# Patient Record
Sex: MALE | Race: WHITE | Employment: UNEMPLOYED | URBAN - METROPOLITAN AREA
[De-identification: names, ages, dates, MRNs, and addresses within clinical notes are randomized per-mention and may not be internally consistent; named-entity substitution may affect disease eponyms.]

---

## 2021-10-29 ENCOUNTER — HOSPITAL ENCOUNTER (INPATIENT)
Age: 21
LOS: 10 days | Discharge: HOME | DRG: 885 | End: 2021-11-08
Attending: EMERGENCY MEDICINE | Admitting: PSYCHIATRY & NEUROLOGY
Payer: COMMERCIAL

## 2021-10-29 DIAGNOSIS — F32.A DEPRESSION, UNSPECIFIED DEPRESSION TYPE: Primary | ICD-10-CM

## 2021-10-29 PROBLEM — F32.2 MDD (MAJOR DEPRESSIVE DISORDER), SEVERE (HCC): Status: ACTIVE | Noted: 2021-10-29

## 2021-10-29 LAB
ACETAMINOPHEN LEVEL: <5 MCG/ML (ref 10–30)
ALBUMIN SERPL-MCNC: 5.1 G/DL (ref 3.5–5.2)
ALP BLD-CCNC: 77 U/L (ref 40–129)
ALT SERPL-CCNC: 15 U/L (ref 0–40)
AMPHETAMINE SCREEN, URINE: NOT DETECTED
ANION GAP SERPL CALCULATED.3IONS-SCNC: 12 MMOL/L (ref 7–16)
AST SERPL-CCNC: 19 U/L (ref 0–39)
BARBITURATE SCREEN URINE: NOT DETECTED
BASOPHILS ABSOLUTE: 0.03 E9/L (ref 0–0.2)
BASOPHILS RELATIVE PERCENT: 0.5 % (ref 0–2)
BENZODIAZEPINE SCREEN, URINE: NOT DETECTED
BILIRUB SERPL-MCNC: 1 MG/DL (ref 0–1.2)
BILIRUBIN URINE: NEGATIVE
BLOOD, URINE: NEGATIVE
BUN BLDV-MCNC: 13 MG/DL (ref 6–20)
CALCIUM SERPL-MCNC: 10 MG/DL (ref 8.6–10.2)
CANNABINOID SCREEN URINE: NOT DETECTED
CHLORIDE BLD-SCNC: 99 MMOL/L (ref 98–107)
CLARITY: CLEAR
CO2: 25 MMOL/L (ref 22–29)
COCAINE METABOLITE SCREEN URINE: NOT DETECTED
COLOR: YELLOW
CREAT SERPL-MCNC: 0.8 MG/DL (ref 0.7–1.2)
EOSINOPHILS ABSOLUTE: 0.18 E9/L (ref 0.05–0.5)
EOSINOPHILS RELATIVE PERCENT: 2.9 % (ref 0–6)
ETHANOL: <10 MG/DL (ref 0–0.08)
FENTANYL SCREEN, URINE: NOT DETECTED
GFR AFRICAN AMERICAN: >60
GFR NON-AFRICAN AMERICAN: >60 ML/MIN/1.73
GLUCOSE BLD-MCNC: 85 MG/DL (ref 74–99)
GLUCOSE URINE: NEGATIVE MG/DL
HCT VFR BLD CALC: 46.7 % (ref 37–54)
HEMOGLOBIN: 16 G/DL (ref 12.5–16.5)
IMMATURE GRANULOCYTES #: 0.01 E9/L
IMMATURE GRANULOCYTES %: 0.2 % (ref 0–5)
INFLUENZA A: NOT DETECTED
INFLUENZA B: NOT DETECTED
KETONES, URINE: NEGATIVE MG/DL
LEUKOCYTE ESTERASE, URINE: NEGATIVE
LYMPHOCYTES ABSOLUTE: 1.9 E9/L (ref 1.5–4)
LYMPHOCYTES RELATIVE PERCENT: 30.8 % (ref 20–42)
Lab: NORMAL
MCH RBC QN AUTO: 31.2 PG (ref 26–35)
MCHC RBC AUTO-ENTMCNC: 34.3 % (ref 32–34.5)
MCV RBC AUTO: 91 FL (ref 80–99.9)
METHADONE SCREEN, URINE: NOT DETECTED
MONOCYTES ABSOLUTE: 0.37 E9/L (ref 0.1–0.95)
MONOCYTES RELATIVE PERCENT: 6 % (ref 2–12)
NEUTROPHILS ABSOLUTE: 3.67 E9/L (ref 1.8–7.3)
NEUTROPHILS RELATIVE PERCENT: 59.6 % (ref 43–80)
NITRITE, URINE: NEGATIVE
OPIATE SCREEN URINE: NOT DETECTED
OXYCODONE URINE: NOT DETECTED
PDW BLD-RTO: 11.4 FL (ref 11.5–15)
PH UA: 7 (ref 5–9)
PHENCYCLIDINE SCREEN URINE: NOT DETECTED
PLATELET # BLD: 282 E9/L (ref 130–450)
PMV BLD AUTO: 9.7 FL (ref 7–12)
POTASSIUM REFLEX MAGNESIUM: 4.7 MMOL/L (ref 3.5–5)
PROTEIN UA: NEGATIVE MG/DL
RBC # BLD: 5.13 E12/L (ref 3.8–5.8)
SALICYLATE, SERUM: <0.3 MG/DL (ref 0–30)
SARS-COV-2 RNA, RT PCR: NOT DETECTED
SODIUM BLD-SCNC: 136 MMOL/L (ref 132–146)
SPECIFIC GRAVITY UA: 1.02 (ref 1–1.03)
TOTAL PROTEIN: 7.6 G/DL (ref 6.4–8.3)
TRICYCLIC ANTIDEPRESSANTS SCREEN SERUM: NEGATIVE NG/ML
UROBILINOGEN, URINE: 2 E.U./DL
WBC # BLD: 6.2 E9/L (ref 4.5–11.5)

## 2021-10-29 PROCEDURE — 93005 ELECTROCARDIOGRAM TRACING: CPT | Performed by: EMERGENCY MEDICINE

## 2021-10-29 PROCEDURE — 80143 DRUG ASSAY ACETAMINOPHEN: CPT

## 2021-10-29 PROCEDURE — 1240000000 HC EMOTIONAL WELLNESS R&B

## 2021-10-29 PROCEDURE — 80307 DRUG TEST PRSMV CHEM ANLYZR: CPT

## 2021-10-29 PROCEDURE — 87636 SARSCOV2 & INF A&B AMP PRB: CPT

## 2021-10-29 PROCEDURE — 85025 COMPLETE CBC W/AUTO DIFF WBC: CPT

## 2021-10-29 PROCEDURE — 80179 DRUG ASSAY SALICYLATE: CPT

## 2021-10-29 PROCEDURE — 82077 ASSAY SPEC XCP UR&BREATH IA: CPT

## 2021-10-29 PROCEDURE — 80053 COMPREHEN METABOLIC PANEL: CPT

## 2021-10-29 PROCEDURE — 99284 EMERGENCY DEPT VISIT MOD MDM: CPT

## 2021-10-29 PROCEDURE — 81003 URINALYSIS AUTO W/O SCOPE: CPT

## 2021-10-29 ASSESSMENT — LIFESTYLE VARIABLES: HISTORY_ALCOHOL_USE: NO

## 2021-10-29 NOTE — ED PROVIDER NOTES
HPI:  10/29/21,   Time: 5:05 PM EDT       Christopher Alvarenga is a 22 y.o. male presenting to the ED for depressed/not eating/stressed, beginning weeks ago. The complaint has been persistent, moderate in severity, and worsened by stress. 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 Pt won't talk on exam, has access to firearm. No n/v/d/fever/chills/sweats/abd pain/cp/sob    Review of Systems:   Pertinent positives and negatives are stated within HPI, all other systems reviewed and are negative.          --------------------------------------------- PAST HISTORY ---------------------------------------------  Past Medical History:  has no past medical history on file. Past Surgical History:  has no past surgical history on file. Social History:  reports that he has never smoked. He has never used smokeless tobacco. He reports that he does not drink alcohol and does not use drugs. Family History: family history is not on file. The patients home medications have been reviewed. Allergies: Patient has no known allergies. ---------------------------------------------------PHYSICAL EXAM--------------------------------------    Constitutional/General: Alert and oriented x3,   Head: Normocephalic and atraumatic  Eyes: PERRL, EOMI, conjunctive normal, sclera non icteric  Mouth: Oropharynx clear, handling secretions,   Neck: Supple, full ROM,   Respiratory: Not in respiratory distress  Cardiovascular:  Regular rate. Regular rhythm. No murmurs, gallops, or rubs. 2+ distal pulses  Chest: No chest wall tenderness  GI:  Abdomen Soft, Non tender, Non distended. Musculoskeletal: Moves all extremities x 4. Warm and well perfused, no clubbing, cyanosis, or edema. Capillary refill <3 seconds  Integument: skin warm and dry. No rashes.    Lymphatic: no lymphadenopathy noted  Neurologic: GCS 15, no focal deficits,   Psychiatric: flat Affect    -------------------------------------------------- RESULTS -------------------------------------------------  I have personally reviewed all laboratory and imaging results for this patient. Results are listed below.      LABS:  Results for orders placed or performed during the hospital encounter of 10/29/21   COVID-19 & Influenza Combo    Specimen: Nasopharyngeal Swab   Result Value Ref Range    SARS-CoV-2 RNA, RT PCR NOT DETECTED NOT DETECTED    INFLUENZA A NOT DETECTED NOT DETECTED    INFLUENZA B NOT DETECTED NOT DETECTED   CBC Auto Differential   Result Value Ref Range    WBC 6.2 4.5 - 11.5 E9/L    RBC 5.13 3.80 - 5.80 E12/L    Hemoglobin 16.0 12.5 - 16.5 g/dL    Hematocrit 46.7 37.0 - 54.0 %    MCV 91.0 80.0 - 99.9 fL    MCH 31.2 26.0 - 35.0 pg    MCHC 34.3 32.0 - 34.5 %    RDW 11.4 (L) 11.5 - 15.0 fL    Platelets 253 972 - 609 E9/L    MPV 9.7 7.0 - 12.0 fL    Neutrophils % 59.6 43.0 - 80.0 %    Immature Granulocytes % 0.2 0.0 - 5.0 %    Lymphocytes % 30.8 20.0 - 42.0 %    Monocytes % 6.0 2.0 - 12.0 %    Eosinophils % 2.9 0.0 - 6.0 %    Basophils % 0.5 0.0 - 2.0 %    Neutrophils Absolute 3.67 1.80 - 7.30 E9/L    Immature Granulocytes # 0.01 E9/L    Lymphocytes Absolute 1.90 1.50 - 4.00 E9/L    Monocytes Absolute 0.37 0.10 - 0.95 E9/L    Eosinophils Absolute 0.18 0.05 - 0.50 E9/L    Basophils Absolute 0.03 0.00 - 0.20 E9/L   Comprehensive Metabolic Panel w/ Reflex to MG   Result Value Ref Range    Sodium 136 132 - 146 mmol/L    Potassium reflex Magnesium 4.7 3.5 - 5.0 mmol/L    Chloride 99 98 - 107 mmol/L    CO2 25 22 - 29 mmol/L    Anion Gap 12 7 - 16 mmol/L    Glucose 85 74 - 99 mg/dL    BUN 13 6 - 20 mg/dL    CREATININE 0.8 0.7 - 1.2 mg/dL    GFR Non-African American >60 >=60 mL/min/1.73    GFR African American >60     Calcium 10.0 8.6 - 10.2 mg/dL    Total Protein 7.6 6.4 - 8.3 g/dL    Albumin 5.1 3.5 - 5.2 g/dL    Total Bilirubin 1.0 0.0 - 1.2 mg/dL    Alkaline Phosphatase 77 40 - 129 U/L    ALT 15 0 - 40 U/L    AST 19 0 - 39 U/L   Urine Drug Screen   Result Value Ref Range    Drug Screen Comment: see below    Serum Drug Screen   Result Value Ref Range    Ethanol Lvl <10 mg/dL    Acetaminophen Level <5.0 (L) 10.0 - 47.1 mcg/mL    Salicylate, Serum <0.1 0.0 - 30.0 mg/dL    TCA Scrn NEGATIVE Cutoff:300 ng/mL   EKG 12 Lead   Result Value Ref Range    Ventricular Rate 61 BPM    Atrial Rate 61 BPM    P-R Interval 136 ms    QRS Duration 94 ms    Q-T Interval 426 ms    QTc Calculation (Bazett) 428 ms    P Axis -13 degrees    R Axis 78 degrees    T Axis 54 degrees       RADIOLOGY:  Interpreted by Radiologist.  No orders to display       EKG: This EKG is signed and interpreted by the EP. Time: 1729  Rate: 60  Rhythm: Sinus  Interpretation: non-specific EKG  Comparison: None      ------------------------- NURSING NOTES AND VITALS REVIEWED ---------------------------   The nursing notes within the ED encounter and vital signs as below have been reviewed by myself. /68   Pulse 63   Temp 98.6 °F (37 °C) (Oral)   Resp 16   SpO2 98%   Oxygen Saturation Interpretation: Normal    The patients available past medical records and past encounters were reviewed. ------------------------------ ED COURSE/MEDICAL DECISION MAKING----------------------  Medications - No data to display      ED COURSE:       Medical Decision Making:    Medically clear, social work for psych dispo      This patient's ED course included: a personal history and physicial examination    This patient has remained hemodynamically stable during their ED course. Consultations:             Social work    Critical Care:         Counseling: The emergency provider has spoken with the patient and discussed todays results, in addition to providing specific details for the plan of care and counseling regarding the diagnosis and prognosis. Questions are answered at this time and they are agreeable with the plan. --------------------------------- IMPRESSION AND DISPOSITION ---------------------------------    IMPRESSION  1. Depression, unspecified depression type        DISPOSITION  Disposition: Admit to mental health unit - medically cleared for admission  Patient condition is stable    NOTE: This report was transcribed using voice recognition software.  Every effort was made to ensure accuracy; however, inadvertent computerized transcription errors may be present        Sully Cox MD  10/29/21 9153

## 2021-10-29 NOTE — ED NOTES
Emergency Department [facility] AN AFFILIATE OF [facility] Biopsychosocial Assessment Note    Chief Complaint:  The pt is a 22 yr old white male who was pink slipped by the [address] [place] [address] . MSE: The pt presented with blocked thought process, speaking mono tone with a flat affect- he was staring straight ahead and did not move the entire interview. He appears somewhat catatonic but did grab food tray when I left. He denied a hx of AVH. He provided some but not all info with brief yes or no answers. Clinical Summary/History: The pt was pink slipped by the [facility]  due to failure to thrive. The pink slip states that the pt is severely depressed to the point of not communicating his thoughts and feelings. The pink slip further stated that the pt has not kept up with his personal hygiene. He has been unwilling to F/U with medical and psychiatric services and has stated that he will be withdraw from classes due to lack of payment. [facility] feels that this increases his risk as school may be a protective factor. He denied to [facility] that he was suicidal or homicidal but did admit that he had access to a gun. The pt denied SI, HI, AVH, hx of AOD, and hx of in or outpt psych except for [facility] counseling center on 2 occasions. He communicated that he is not doing well in all of his classes and he is taking 6 classes and is majoring in [employer] [employer],. He stated that he lives with his mom and 3 sibs. He stated that he got a ride to [address] [place] [address] today and that his mom does not know that he is at the ED and when asked if he would like her to know he would not answer. When asked about the gun he has access to he stated that he does not have access but knows where one is but will not say. He admitted that he found out through an email that he was getting kicked out of school and that he did not do the necessary steps to stay in and he has not been able to help himself.   He did state that he wants to stay in school and tries to do his homework. He would not answer wether or not he was depressed, sad, wether he has a support system, how long he has been having difficulty and if something bad had happened to him. The pt appears to need admitted for safety and stabilization. He does not appear to be able to care for himself and could be a danger to himself. Once medically cleared the pt will be reviewed for admission to [address] [place] [address]. Gender  [x] Male [] Female [] Transgender  [] Other    Sexual Orientation    [x] Heterosexual [] Homosexual [] Bisexual [] Other    Suicidal Behavioral: CSSR-S Complete. [] Reports:    [] Past [] Present   [x] Denies    Homicidal/ Violent Behavior  [] Reports:   [] Past [] Present   [x] Denies     Hallucinations/Delusions   [] Reports:   [x] Denies     Substance Use/Alcohol Use/Addiction: SBIRT Screen Complete.    [] Reports:   [x] Denies     Trauma History  [] Reports: Refused to answer  [] Denies     Collateral Information: pink slip- [facility]      Level of Care/Disposition Plan  [] Home:   [] Outpatient Provider:   [] Crisis Unit:   [x] Inpatient Psychiatric Unit: [address] [place] [address]  [] Other:        [address] [place] [address]  10/29/21 2430

## 2021-10-29 NOTE — ED NOTES
The pt was accepted to [facility] room 7964. Disposition called to Steven Cummins in admitting.      Era Batista, [facility]  10/29/21 1959

## 2021-10-30 PROBLEM — F32.3 MDD (MAJOR DEPRESSIVE DISORDER), SINGLE EPISODE, SEVERE WITH PSYCHOTIC FEATURES (HCC): Status: ACTIVE | Noted: 2021-10-30

## 2021-10-30 PROBLEM — F32.2 MDD (MAJOR DEPRESSIVE DISORDER), SEVERE (HCC): Status: RESOLVED | Noted: 2021-10-29 | Resolved: 2021-10-30

## 2021-10-30 PROCEDURE — 1240000000 HC EMOTIONAL WELLNESS R&B

## 2021-10-30 PROCEDURE — 6370000000 HC RX 637 (ALT 250 FOR IP): Performed by: PSYCHIATRY & NEUROLOGY

## 2021-10-30 PROCEDURE — 99222 1ST HOSP IP/OBS MODERATE 55: CPT | Performed by: PSYCHIATRY & NEUROLOGY

## 2021-10-30 RX ORDER — ACETAMINOPHEN 325 MG/1
650 TABLET ORAL EVERY 6 HOURS PRN
Status: DISCONTINUED | OUTPATIENT
Start: 2021-10-30 | End: 2021-11-08 | Stop reason: HOSPADM

## 2021-10-30 RX ORDER — HALOPERIDOL 5 MG
5 TABLET ORAL EVERY 6 HOURS PRN
Status: DISCONTINUED | OUTPATIENT
Start: 2021-10-30 | End: 2021-11-08 | Stop reason: HOSPADM

## 2021-10-30 RX ORDER — HYDROXYZINE PAMOATE 50 MG/1
50 CAPSULE ORAL 3 TIMES DAILY PRN
Status: DISCONTINUED | OUTPATIENT
Start: 2021-10-30 | End: 2021-11-08 | Stop reason: HOSPADM

## 2021-10-30 RX ORDER — HALOPERIDOL 5 MG/ML
5 INJECTION INTRAMUSCULAR EVERY 6 HOURS PRN
Status: DISCONTINUED | OUTPATIENT
Start: 2021-10-30 | End: 2021-11-08 | Stop reason: HOSPADM

## 2021-10-30 RX ORDER — NICOTINE 21 MG/24HR
1 PATCH, TRANSDERMAL 24 HOURS TRANSDERMAL DAILY
Status: DISCONTINUED | OUTPATIENT
Start: 2021-10-30 | End: 2021-10-30

## 2021-10-30 RX ORDER — TRAZODONE HYDROCHLORIDE 50 MG/1
50 TABLET ORAL NIGHTLY PRN
Status: DISCONTINUED | OUTPATIENT
Start: 2021-10-30 | End: 2021-11-08 | Stop reason: HOSPADM

## 2021-10-30 RX ORDER — MAGNESIUM HYDROXIDE/ALUMINUM HYDROXICE/SIMETHICONE 120; 1200; 1200 MG/30ML; MG/30ML; MG/30ML
30 SUSPENSION ORAL PRN
Status: DISCONTINUED | OUTPATIENT
Start: 2021-10-30 | End: 2021-11-08 | Stop reason: HOSPADM

## 2021-10-30 RX ORDER — OLANZAPINE 5 MG/1
5 TABLET ORAL NIGHTLY
Status: DISCONTINUED | OUTPATIENT
Start: 2021-10-30 | End: 2021-10-31

## 2021-10-30 RX ORDER — ESCITALOPRAM OXALATE 10 MG/1
10 TABLET ORAL DAILY
Status: DISCONTINUED | OUTPATIENT
Start: 2021-10-30 | End: 2021-11-02

## 2021-10-30 RX ADMIN — OLANZAPINE 5 MG: 5 TABLET, FILM COATED ORAL at 20:55

## 2021-10-30 RX ADMIN — ESCITALOPRAM 10 MG: 10 TABLET, FILM COATED ORAL at 11:53

## 2021-10-30 ASSESSMENT — PAIN - FUNCTIONAL ASSESSMENT: PAIN_FUNCTIONAL_ASSESSMENT: 0-10

## 2021-10-30 NOTE — BH NOTE
Pt denies suicidal or homicidal ideations. Pt denies hallucinations. Pt is isolative to room, pt continues to have a flat affect, poverty of speech / content, avoidant eye contact. No other behavioral concerns. Will follow and monitor.

## 2021-10-30 NOTE — PLAN OF CARE
Pt is stable, denies suicidal or homicidal ideations. Pt denies hallucinations. Pt has a flat and avoidant contact. Pt has negative eye contact. Pt answers questions with one word responses. Pt is isolative to room. Pt does not report a goal during morning assessment  Will monitor and follow.

## 2021-10-30 NOTE — H&P
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 His urine tox was negative blood alcohol was negative. He was pink slipped and admitted to [facility] for severe depression and with possible psychosis. Patient was largely uncooperative in the emergency department will not talk about heart is going on and will not even give any pertinent information. He will only say yes and no to some of the question. According to the pink slip by MAYTE WATTERS counselor student was dropped from the class not eating drinking batting will not talk on exam and has access to firearm. He does not answer all the question only selectively answers with yes and no. 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 According to the family he walks at [place] and he does not speak to his mind. During the whole time I interviewed he never meant in any eye contact and looking at the ground. Hygiene and grooming extremely poor. He says no to all the question but he clearly is under a lot of stress and severely depressed. He also seems almost mute and suspicious internally preoccupied. He denied any manic symptoms psychotic symptoms hallucination but he certainly is very guarded and suspicious. He said he does not want to take any medication. I talked to him about the current problem and recommended medication due to his own benefit and safety. I discussed the risk benefits side effect possible outcome alternative of the medication but he did not say he understood or not but keeps saying he will not take. I discussed with him that he is on pink slip and he is in acute crisis.   If he does not cooperate with the treatment and does not take the treatment he reports himself acute wrist and the team will not be able to make discharge planning. Case may be hard at the probate court next Friday if he chooses not to start medication. Cognitive function is dull. Insight and judgment poor. Impulse control poor. ER notes suggest that he has access to gun but patient denies    PAST PSYCHIATRIC HISTORY:   [facility] but [facility], he said he saw the counselor twice  Denies having any treatment for depression    PAST MEDICAL HISTORY: History reviewed. No pertinent past medical history. MEDICAL ROS:  All reviews are negative except what is stated in HPI    ALLERGIES: Patient has no known allergies. FAMILY PSYCHIATRIC HISTORY:   Does not answer question regarding that    Personal family and social history :   Lives with the mother and 3 brothers. Sister moved out. Father  about a month ago of heart attack and was not expected. He was given depressed before that. Denies any physical sexual or emotional abuse. Denies any family history of suicide    SUBSTANCE ABUSE HISTORY:   Denies.   Urine drug screening negative blood alcohol negative    VITALS: BP (!) 112/56   Pulse 57   Temp 98.1 °F (36.7 °C)   Resp 14   SpO2 100%      Physical Examination:     Head: x  Atraumatic: x normocephalic  Skin and Mucosa        Moist x  Dry   Pale  x Normal   Neck:  Thyroid  Palpable   x  Not palpable   venus distention   adenopathy   Chest: x Clear   Rhonchi     Wheezing   CV:  xS1   xS2    xNo murmer   Abdomen:  x  Soft    Tender    Viceromegaly   Extremities:  x No Edema     Edema     Cranial Nerves Examination:     CN II:   xPupils are reactive to light  Pupils are non reactive to light  CN III, IV, VI:  xNo eye deviation    No diplopia or ptosis   CN V:    xFacial Sensation is intact     Facial Sensation is not intact   CN IIIV:   x Hearing is normal to rubbing fingers   CN IX, X:     xNormal gag reflex and phonation   CN XI:   xShoulder shrug and neck rotation is normal  CNXII:    xTongue is midline no deviation or atrophy      For further PE refer to ED note      MENTAL STATUS EXAM:   Mental status examination revealed a poorly groomed unkempt poor hygiene [address] lean and thin  man in hospital gown does not make any eye contact and was looking at the floor during the whole interview. Psychomotor revealed extreme retardation. Eye contact was nonexistent. Speech only say yes or no to certain question and almost mute. Mood\" depressed because I am failing class\" affect is blunted anxious guarded. Thought process slow and almost mute. Thought contents with guardedness possible paranoia would not make eye contact suspicious looking. Denies suicidal homicidal thought or any other symptoms despite having a lot of signs of mental status changes. Insight and judgment poor. Impulse control poor. Cognitive function could not be ascertained due to noncooperation at this time and not answering most of the question. Alert oriented to self and possibly the place.     LABS:   Admission on 10/29/2021   Component Date Value Ref Range Status    Ventricular Rate 10/29/2021 61  BPM Preliminary    Atrial Rate 10/29/2021 61  BPM Preliminary    P-R Interval 10/29/2021 136  ms Preliminary    QRS Duration 10/29/2021 94  ms Preliminary    Q-T Interval 10/29/2021 426  ms Preliminary    QTc Calculation (Bazett) 10/29/2021 428  ms Preliminary    P Axis 10/29/2021 -13  degrees Preliminary    R Axis 10/29/2021 78  degrees Preliminary    T Axis 10/29/2021 54  degrees Preliminary    WBC 10/29/2021 6.2  4.5 - 11.5 E9/L Final    RBC 10/29/2021 5.13  3.80 - 5.80 E12/L Final    Hemoglobin 10/29/2021 16.0  12.5 - 16.5 g/dL Final    Hematocrit 10/29/2021 46.7  37.0 - 54.0 % Final    MCV 10/29/2021 91.0  80.0 - 99.9 fL Final    MCH 10/29/2021 31.2  26.0 - 35.0 pg Final    MCHC 10/29/2021 34.3  32.0 - 34.5 % Final    RDW 10/29/2021 11.4* 11.5 - 15.0 fL Final    Platelets 25/19/3349 282  130 - 450 E9/L Final    MPV 10/29/2021 9.7  7.0 - 12.0 fL Final    Neutrophils % 10/29/2021 59.6  43.0 - 80.0 % Final    Immature Granulocytes % 10/29/2021 0.2  0.0 - 5.0 % Final    Lymphocytes % 10/29/2021 30.8  20.0 - 42.0 % Final    Monocytes % 10/29/2021 6.0  2.0 - 12.0 % Final    Eosinophils % 10/29/2021 2.9  0.0 - 6.0 % Final    Basophils % 10/29/2021 0.5  0.0 - 2.0 % Final    Neutrophils Absolute 10/29/2021 3.67  1.80 - 7.30 E9/L Final    Immature Granulocytes # 10/29/2021 0.01  E9/L Final    Lymphocytes Absolute 10/29/2021 1.90  1.50 - 4.00 E9/L Final    Monocytes Absolute 10/29/2021 0.37  0.10 - 0.95 E9/L Final    Eosinophils Absolute 10/29/2021 0.18  0.05 - 0.50 E9/L Final    Basophils Absolute 10/29/2021 0.03  0.00 - 0.20 E9/L Final    Sodium 10/29/2021 136  132 - 146 mmol/L Final    Potassium reflex Magnesium 10/29/2021 4.7  3.5 - 5.0 mmol/L Final    Chloride 10/29/2021 99  98 - 107 mmol/L Final    CO2 10/29/2021 25  22 - 29 mmol/L Final    Anion Gap 10/29/2021 12  7 - 16 mmol/L Final    Glucose 10/29/2021 85  74 - 99 mg/dL Final    BUN 10/29/2021 13  6 - 20 mg/dL Final    CREATININE 10/29/2021 0.8  0.7 - 1.2 mg/dL Final    GFR Non- 10/29/2021 >60  >=60 mL/min/1.73 Final    Comment: Chronic Kidney Disease: less than 60 ml/min/1.73 sq.m. Kidney Failure: less than 15 ml/min/1.73 sq.m. Results valid for patients 18 years and older.       GFR  10/29/2021 >60   Final    Calcium 10/29/2021 10.0  8.6 - 10.2 mg/dL Final    Total Protein 10/29/2021 7.6  6.4 - 8.3 g/dL Final    Albumin 10/29/2021 5.1  3.5 - 5.2 g/dL Final    Total Bilirubin 10/29/2021 1.0  0.0 - 1.2 mg/dL Final    Alkaline Phosphatase 10/29/2021 77  40 - 129 U/L Final    ALT 10/29/2021 15  0 - 40 U/L Final    AST 10/29/2021 19  0 - 39 U/L Final    Color, UA 10/29/2021 Yellow  Straw/Yellow Final    Clarity, UA 10/29/2021 Clear  Clear Final    Glucose, Ur 10/29/2021 Negative  Negative mg/dL Final    Bilirubin Urine 10/29/2021 Negative  Negative Final    Ketones, Urine 10/29/2021 Negative  Negative mg/dL Final    Specific Enloe, UA 10/29/2021 1.025  1.005 - 1.030 Final    Blood, Urine 10/29/2021 Negative  Negative Final    pH, UA 10/29/2021 7.0  5.0 - 9.0 Final    Protein, UA 10/29/2021 Negative  Negative mg/dL Final    Urobilinogen, Urine 10/29/2021 2.0* <2.0 E.U./dL Final    Nitrite, Urine 10/29/2021 Negative  Negative Final    Leukocyte Esterase, Urine 10/29/2021 Negative  Negative Final    Amphetamine Screen, Urine 10/29/2021 NOT DETECTED  Negative <1000 ng/mL Final    Barbiturate Screen, Ur 10/29/2021 NOT DETECTED  Negative < 200 ng/mL Final    Benzodiazepine Screen, Urine 10/29/2021 NOT DETECTED  Negative < 200 ng/mL Final    Cannabinoid Scrn, Ur 10/29/2021 NOT DETECTED  Negative < 50ng/mL Final    Cocaine Metabolite Screen, Urine 10/29/2021 NOT DETECTED  Negative < 300 ng/mL Final    Opiate Scrn, Ur 10/29/2021 NOT DETECTED  Negative < 300ng/mL Final    Note:  The Opiate Screen is not intended to detect Oxycodone.  PCP Screen, Urine 10/29/2021 NOT DETECTED  Negative < 25 ng/mL Final    Methadone Screen, Urine 10/29/2021 NOT DETECTED  Negative <300 ng/mL Final    Oxycodone Urine 10/29/2021 NOT DETECTED  Negative <100 ng/mL Final    FENTANYL SCREEN, URINE 10/29/2021 NOT DETECTED  Negative <1 ng/mL Final    Drug Screen Comment: 10/29/2021 see below   Final    Comment: These drug screen results are for medical purposes only and  should not be considered definitive or confirmed. The drug  methodology concentration value must be greater than or equal  to the cutoff to be reported as positive. Confirmatory testing  orders and/or interpretive screening questions can be directed  to toxicology at [phone].     The absence of expected drug(s) and/or metabolite(s) may be due  to inappropriate timing of specimen collection relative to drug  administration, poor drug absorption, diluted/adulterated urine,  or limitations of screening testing methodology.  Ethanol Lvl 10/29/2021 <10  mg/dL Final    Not Detected    Acetaminophen Level 10/29/2021 <5.0* 10.0 - 30.0 mcg/mL Final    Salicylate, Serum 64/69/0067 <0.3  0.0 - 30.0 mg/dL Final    TCA Scrn 10/29/2021 NEGATIVE  Cutoff:300 ng/mL Final    SARS-CoV-2 RNA, RT PCR 10/29/2021 NOT DETECTED  NOT DETECTED Final    Comment: Not Detected results do not preclude SARS-CoV-2 infection and  should not be used as the sole basis for patient management  decisions. Results must be combined with clinical observations,  patient history, and epidemiological information. Testing was performed using JOSIAH RICKI SARS-CoV-2 and Influenza A/B  nucleic acid assay. This test is a multiplex Real-Time Reverse  Transcriptase Polymerase Chain Reaction (RT-PCR)-based in vitro  diagnostic test intended for the qualitative detection of nucleic  acids from SARS-CoV-2, influenza A, and influenza B in nasopharyngeal  and nasal swab specimens for use under the FDAs Emergency Use  Authorization (EUA) only.     Patient Fact Sheet:  FindDrives.pl  Provider Fact Sheet: FindDrives.pl  EUA: FindDrives.pl  IFU: FindDrives.pl    Methodology:  RT-PCR      INFLUENZA A 10/29/2021 NOT DETECTED  NOT DETECTED Final    INFLUENZA B 10/29/2021 NOT DETECTED  NOT DETECTED Final           MEDICATIONS: Current Facility-Administered Medications: acetaminophen (TYLENOL) tablet 650 mg, 650 mg, Oral, Q6H PRN  magnesium hydroxide (MILK OF MAGNESIA) 400 MG/5ML suspension 30 mL, 30 mL, Oral, Daily PRN  aluminum & magnesium hydroxide-simethicone (MAALOX) 200-200-20 MG/5ML suspension 30 mL, 30 mL, Oral, PRN  hydrOXYzine (VISTARIL) capsule 50 mg, 50 psychiatrist    Electronically signed by Emory Romero MD on 10/30/2021 at 10:35 AM        Signed:  Emory Romero MD  10/30/2021  10:19 AM

## 2021-10-30 NOTE — BH NOTE
[facility]  Initial Interdisciplinary Treatment Plan NOTE    Review Date & Time: 10-30-21  1000 am    Patient was not in treatment team    Admission Type:   Admission Type: Involuntary    Reason for admission:  Reason for Admission: Depression. NO SI. Pt is student at [employer]. Doing poorly in class and not taking care of self. Per pt, counseling was offered by [employer]      Estimated Length of Stay Update:  3-5 days  Estimated Discharge Date Update: 3-5 days    EDUCATION:   Learner Progress Toward Treatment Goals: Reviewed results and recommendations of this team and Reviewed group plan and strategies    Method: Small group    Outcome: Verbalized understanding    PATIENT GOALS: pt does not report a goal during first morning assessment. PLAN/TREATMENT RECOMMENDATIONS UPDATE: begin medication regimen, assess pt responses. GOALS UPDATE:   Time frame for Short-Term Goals: Daily re assessment.      Elton Garsia RN

## 2021-10-30 NOTE — PROGRESS NOTES
[facility]  Admission Note     Pt brought to ER by [facility] Counselor for Failure to Thrive. 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 Denies SI, HI, and AVH. States he understands why he is here, Pt is flat, no eye contact. Pt refusing to answer most questions. Pt did not admit to being depressed but is upset that he received an email stating he was going to fail this semester. Pt denies any Depression or treatment in the past. Did acknowledge [facility] offered counseling to him but he did not attend per pt. Denies any medication, drug, or alcohol use. Pt lives at home with Mom and siblings. Denies any issues at home. Declined tour of unit at this time. Offered food and drink. Pt declined. Water pitcher in room. Admission Type:   Admission Type: Involuntary    Reason for admission:  Reason for Admission: Depression. NO SI. Pt is student at [employer]. Doing poorly in class and not taking care of self. Per pt, counseling was offered by [facility]    PATIENT STRENGTHS:  Strengths:  (KALIE)    Patient Strengths and Limitations:  Limitations:  (pt refused to answer)    Addictive Behavior:   Addictive Behavior  In the past 3 months, have you felt or has someone told you that you have a problem with:  :  (pt refused to answer)  Do you have a history of Chemical Use?: No  Do you have a history of Alcohol Use?: No  Do you have a history of Street Drug Abuse?: No  Histroy of Prescripton Drug Abuse?: No    Medical Problems:   History reviewed. No pertinent past medical history.     Status EXAM:  Status and Exam  Normal: No  Facial Expression: Avoids Gaze, Flat, Sad, Worried  Affect: Congruent  Level of Consciousness: Alert  Mood:Normal: No  Mood: Depressed, Sad  Motor Activity:Normal: No  Motor Activity: Decreased  Interview Behavior: Uncooperative/Withdrawn  Preception: [place] to Person, [place] to Time, [place] to Place, [place] to Situation  Attention:Normal: No  Attention: Distractible  Thought Processes: Circumstantial  Thought Content:Normal:  (KALIE. Pt states yes or no answerd if he chooses to do so)  Hallucinations: None  Delusions: No  Memory:Normal: Yes  Insight and Judgment: No  Insight and Judgment: Poor Insight, Unmotivated, Poor Judgment  Present Suicidal Ideation: No  Present Homicidal Ideation: No    Tobacco Screening:  Practical Counseling, on admission, ulises X, if applicable and completed (first 3 are required if patient doesn't refuse):            ( )  Recognizing danger situations (included triggers and roadblocks)                    ( )  Coping skills (new ways to manage stress, exercise, relaxation techniques, changing routine, distraction)                                                           ( )  Basic information about quitting (benefits of quitting, techniques in how to quit, available resources  ( ) [facility]                                           ( ) Patient refused counseling  (x ) Patient has not smoked in the last 30 days    Metabolic Screening:    No results found for: LABA1C    No results found for: CHOL  No results found for: TRIG  No results found for: HDL  No components found for: LDLCAL  No results found for: LABVLDL      There is no height or weight on file to calculate BMI. BP Readings from Last 2 Encounters:   10/29/21 (!) 111/57           Pt admitted with followings belongings:  Clothing: Footwear, Pants, Shirt, Socks, Undergarments (Comment)  Other Valuables: Cell phone, Other (Comment) (earbuds)     Patient's home medications were  N/A  Patient oriented to surroundings : Pt refused. and program expectations and copy of patient rights given. Received admission packet:. Consents reviewed, signed  Patient education on precautions.                   Vandana Moreno RN

## 2021-10-30 NOTE — CARE COORDINATION
LPC attempted to complete assessment and CSSR-S with the pt and obtain collateral VICKEY. Pt is completely non-verbal when addressed. Pt's RN reports poverty of content and lack of cooperation as well. Will attempt again later.     Electronically signed by Arely Camejo LPC on 10/30/2021 at 10:49 AM

## 2021-10-30 NOTE — ED NOTES
Pt's mom Lyndle Nyhan came to the hospital and had no idea where he was until she recently called his phone. This worker has repeatedly asked the pt if he wanted to call his mom or have this worker call. The pt's mom stated that the pt's dad  of a heart attack last month and he had been sick but they were not expecting him to die. She stated that he was depressed prior to that but he has become more despondent lately. She stated that she thinks he had trouble with some of his classes online last yr but has no idea how he is doing this year b/c he wont show her. She stated that he has never mentioned suicide to her. She stated that he does not talk to anyone about his feelings and she has been becoming concerned. She stated that in addition to going to school he works at [employer].       Josiane Jane, [facility]  10/29/21 4709

## 2021-10-31 LAB
EKG ATRIAL RATE: 61 BPM
EKG P AXIS: -13 DEGREES
EKG P-R INTERVAL: 136 MS
EKG Q-T INTERVAL: 426 MS
EKG QRS DURATION: 94 MS
EKG QTC CALCULATION (BAZETT): 428 MS
EKG R AXIS: 78 DEGREES
EKG T AXIS: 54 DEGREES
EKG VENTRICULAR RATE: 61 BPM

## 2021-10-31 PROCEDURE — 1240000000 HC EMOTIONAL WELLNESS R&B

## 2021-10-31 PROCEDURE — 6370000000 HC RX 637 (ALT 250 FOR IP): Performed by: PSYCHIATRY & NEUROLOGY

## 2021-10-31 PROCEDURE — 6370000000 HC RX 637 (ALT 250 FOR IP): Performed by: NURSE PRACTITIONER

## 2021-10-31 PROCEDURE — 93010 ELECTROCARDIOGRAM REPORT: CPT | Performed by: INTERNAL MEDICINE

## 2021-10-31 PROCEDURE — 99231 SBSQ HOSP IP/OBS SF/LOW 25: CPT | Performed by: NURSE PRACTITIONER

## 2021-10-31 RX ORDER — OLANZAPINE 5 MG/1
7.5 TABLET ORAL NIGHTLY
Status: DISCONTINUED | OUTPATIENT
Start: 2021-10-31 | End: 2021-11-01

## 2021-10-31 RX ADMIN — ESCITALOPRAM 10 MG: 10 TABLET, FILM COATED ORAL at 10:10

## 2021-10-31 RX ADMIN — OLANZAPINE 7.5 MG: 5 TABLET, FILM COATED ORAL at 22:28

## 2021-10-31 ASSESSMENT — PAIN - FUNCTIONAL ASSESSMENT: PAIN_FUNCTIONAL_ASSESSMENT: 0-10

## 2021-10-31 NOTE — PLAN OF CARE
Pt denies suicidal or homicidal ideations. Pt denies hallucinations. Pt has a flat affect. Avoids eye contact, poverty of content / speech. Pt isolative to room. Minimal engagement with staff. Pt does not report a goal during first morning assessment. Will follow and monitor.

## 2021-10-31 NOTE — PLAN OF CARE
Problem: Altered Mood, Depressive Behavior:  Goal: Able to verbalize acceptance of life and situations over which he or she has no control  Description: Able to verbalize acceptance of life and situations over which he or she has no control  10/30/2021 2211 by Venus Mcghee RN  Outcome: Ongoing     Problem: Altered Mood, Depressive Behavior:  Goal: Ability to disclose and discuss suicidal ideas will improve  Description: Ability to disclose and discuss suicidal ideas will improve  Outcome: Ongoing     Problem: Altered Mood, Depressive Behavior:  Goal: Participates in care planning  Description: Participates in care planning  Outcome: Ongoing     Pt is withdrawn to his room and self. Pt is quiet and guarded and disinterested. Pt refused to answer any questions and remained silent and stared at this nurse during medication administration. Pt took medications.

## 2021-10-31 NOTE — PROGRESS NOTES
[facility] FOLLOW-UP NOTE     10/31/2021     Patient was seen and examined in person, Chart reviewed   Patient's case discussed with staff/team    Chief Complaint: Irritable, dismissive    Interim History:     Patient assessed in his room this morning, he is dismissive, irritable, not participating in assessment. Offering \"yes\" and \"no\" responses to questions, he does not elaborate on any response. He is extremely depressed, blunted and paranoid. He is guarded. The patient is taking his medications after initially refusing medications. Assessment is limited. Appetite:   [] Normal/Unchanged  [] Increased  [] Decreased      Sleep:       [] Normal/Unchanged  [] Fair       [] Poor              Energy:    [] Normal/Unchanged  [] Increased  [] Decreased        SI [] Present  [] Absent   HI  []Present  [] Absent     Aggression:  [] yes  [] no    Patient is [] able  [] unable to CONTRACT FOR SAFETY     PAST MEDICAL/PSYCHIATRIC HISTORY:   History reviewed. No pertinent past medical history. FAMILY/SOCIAL HISTORY:  History reviewed. No pertinent family history. Social History     Socioeconomic History    Marital status: Single     Spouse name: Not on file    Number of children: Not on file    Years of education: Not on file    Highest education level: Not on file   Occupational History    Not on file   Tobacco Use    Smoking status: Never Smoker    Smokeless tobacco: Never Used   Vaping Use    Vaping Use: Never used   Substance and Sexual Activity    Alcohol use: Never    Drug use: Never    Sexual activity: Not on file   Other Topics Concern    Not on file   Social History Narrative    Not on file     Social Determinants of Health     Financial Resource Strain:     Difficulty of Paying Living Expenses:    Food Insecurity:     Worried About Running Out of Food in the Last Year:     [address] in the Last Year:    Transportation Needs:     Lack of Transportation (Medical):      Lack of Transportation (Non-Medical):    Physical Activity:     Days of Exercise per Week:     Minutes of Exercise per Session:    Stress:     Feeling of Stress :    Social Connections:     Frequency of Communication with Friends and Family:     Frequency of Social Gatherings with Friends and Family:     Attends [employer] Services:     Active Member of Clubs or Organizations:     Attends Club or Organization Meetings:     Marital Status:    Intimate Partner Violence:     Fear of Current or Ex-Partner:     Emotionally Abused:     Physically Abused:     Sexually Abused:            ROS:  [x] All negative/unchanged except if checked.  Explain positive(checked items) below:  [] Constitutional  [] Eyes  [] Ear/Nose/Mouth/Throat  [] Respiratory  [] CV  [] GI  []   [] Musculoskeletal  [] Skin/Breast  [] Neurological  [] Endocrine  [] Heme/Lymph  [] Allergic/Immunologic    Explanation:     MEDICATIONS:    Current Facility-Administered Medications:     acetaminophen (TYLENOL) tablet 650 mg, 650 mg, Oral, Q6H PRN, Thierry Currie MD    magnesium hydroxide (MILK OF MAGNESIA) 400 MG/5ML suspension 30 mL, 30 mL, Oral, Daily PRN, Thierry Currie MD    aluminum & magnesium hydroxide-simethicone (MAALOX) 200-200-20 MG/5ML suspension 30 mL, 30 mL, Oral, PRN, Thierry Currie MD    hydrOXYzine (VISTARIL) capsule 50 mg, 50 mg, Oral, TID PRN, Thierry Currie MD    haloperidol (HALDOL) tablet 5 mg, 5 mg, Oral, Q6H PRN **OR** haloperidol lactate (HALDOL) injection 5 mg, 5 mg, IntraMUSCular, Q6H PRN, Thierry Currie MD    traZODone (DESYREL) tablet 50 mg, 50 mg, Oral, Nightly PRN, Thierry Currie MD    escitalopram (LEXAPRO) tablet 10 mg, 10 mg, Oral, Daily, Thierry Currie MD, 10 mg at 10/31/21 1010    OLANZapine (ZYPREXA) tablet 5 mg, 5 mg, Oral, Nightly, Thierry Currie MD, 5 mg at 10/30/21 2055      Examination:  BP (!) 106/53   Pulse 54   Temp 98.1 °F (36.7 °C)   Resp 14   SpO2 96%   Gait - steady  Medication side effects(SE): none reported    Mental Status Examination:    Level of consciousness:  within normal limits   Appearance:  poor grooming and poor hygiene  Behavior/Motor:  psychomotor retardation  Attitude toward examiner:  evasive, guarded and withdrawn  Speech:  loud and poverty of speech   Mood: depressed  Affect:  blunted  Thought processes: incoherent  Thought content:paranoid  Cognition:  oriented to person, place, and time   Concentration poor  Insight poor   Judgement poor     ASSESSMENT:   Patient symptoms are:  [] Well controlled  [] Improving  [] Worsening  [x] No change      Diagnosis:   Active Problems:    MDD (major depressive disorder), single episode, severe with psychotic features ([facility] [address]  Resolved Problems:    * No resolved hospital problems. *      LABS:    Recent Labs     10/29/21  1727   WBC 6.2   HGB 16.0        Recent Labs     10/29/21  1727      K 4.7   CL 99   CO2 25   BUN 13   CREATININE 0.8   GLUCOSE 85     Recent Labs     10/29/21  1727   BILITOT 1.0   ALKPHOS 77   AST 19   ALT 15     Lab Results   Component Value Date    LABAMPH NOT DETECTED 10/29/2021    BARBSCNU NOT DETECTED 10/29/2021    LABBENZ NOT DETECTED 10/29/2021    LABMETH NOT DETECTED 10/29/2021    OPIATESCREENURINE NOT DETECTED 10/29/2021    PHENCYCLIDINESCREENURINE NOT DETECTED 10/29/2021    ETOH <10 10/29/2021     No results found for: TSH, FREET4  No results found for: LITHIUM  No results found for: VALPROATE, CBMZ        Treatment Plan:  The patient's diagnosis, treatment plan, medication management were formulated after patient was seen directly by the attending physician and myself and all relevant documentation was reviewed. Risk, benefit, side effects, possible outcomes of the medication and alternatives discussed with the patient and the patient demonstrated understanding.   The patient was also educated that the outcome of treatment will depend on the medication compliance as directed by the prescribers along with regular follow-up, compliance with the labs and other work-up, as clinically indicated. Lexapro 10 mg daily for depression anxiety  Zyprexa 5 mg nightly for psychotic feature      Collateral information: Followed by social work  CD evaluation  Encourage patient to attend group and other milieu activities. Discharge planning discussed with the patient and treatment team.    PSYCHOTHERAPY/COUNSELING:  [x] Therapeutic interview  [x] Supportive  [] CBT  [] Ongoing  [] Other    [x] Patient continues to need, on a daily basis, active treatment furnished directly by or requiring the supervision of inpatient psychiatric personnel      Anticipated Length of stay: 7 to 10 days based on stability       NOTE: This report was transcribed using voice recognition software. Every effort was made to ensure accuracy; however, inadvertent computerized transcription errors may be present.     Electronically signed by SHIRAZ Calloway CNP on 10/31/2021 at 2:47 PM

## 2021-10-31 NOTE — CARE COORDINATION
LPC attempted to meet with pt to attempt to complete assessment again. Pt would not speak or acknowledge LPC when addressed.

## 2021-11-01 PROCEDURE — 1240000000 HC EMOTIONAL WELLNESS R&B

## 2021-11-01 PROCEDURE — 6370000000 HC RX 637 (ALT 250 FOR IP): Performed by: NURSE PRACTITIONER

## 2021-11-01 PROCEDURE — 99232 SBSQ HOSP IP/OBS MODERATE 35: CPT | Performed by: NURSE PRACTITIONER

## 2021-11-01 PROCEDURE — 6370000000 HC RX 637 (ALT 250 FOR IP): Performed by: PSYCHIATRY & NEUROLOGY

## 2021-11-01 RX ORDER — OLANZAPINE 10 MG/1
10 TABLET ORAL NIGHTLY
Status: DISCONTINUED | OUTPATIENT
Start: 2021-11-01 | End: 2021-11-03

## 2021-11-01 RX ADMIN — OLANZAPINE 10 MG: 10 TABLET, FILM COATED ORAL at 21:01

## 2021-11-01 RX ADMIN — ESCITALOPRAM 10 MG: 10 TABLET, FILM COATED ORAL at 10:21

## 2021-11-01 ASSESSMENT — SLEEP AND FATIGUE QUESTIONNAIRES
DO YOU USE A SLEEP AID: COMMENT
DO YOU HAVE DIFFICULTY SLEEPING: COMMENT

## 2021-11-01 ASSESSMENT — LIFESTYLE VARIABLES: HISTORY_ALCOHOL_USE: NO

## 2021-11-01 ASSESSMENT — PATIENT HEALTH QUESTIONNAIRE - PHQ9: SUM OF ALL RESPONSES TO PHQ QUESTIONS 1-9: 0

## 2021-11-01 NOTE — GROUP NOTE
Group Therapy Note    Date: 11/1/2021    Group Start Time: 1120  Group End Time: 1150  Group Topic: Cognitive Skills    [facility]    MARA Mitchell LSW        Group Therapy Note    Attendees: 11         Patient's Goal:  Patient will understand and verbalize grounding techniques    Notes:  Patient participated and made connections    Status After Intervention:  Unchanged    Participation Level:  Active Listener    Participation Quality: Appropriate      Speech:  normal      Thought Process/Content: Logical      Affective Functioning: Congruent      Mood: anxious      Level of consciousness:  Alert      Response to Learning: Able to verbalize current knowledge/experience      Endings: None Reported    Modes of Intervention: Education, Support, Socialization, Exploration, Clarifying, and Problem-solving      Discipline Responsible: /Counselor      Signature:  MARA Mitchell LSW

## 2021-11-01 NOTE — PROGRESS NOTES
Recreation assessment completed. Completed assessment at 3 different intervals, as he would become mute after 3-4 questions. Required much encouragement and time to respond to questions. 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 Enjoys drawing and some video games as outlets. Identified recent loss/death of father a month ago. Accepting of support to try groups as more comfortable.

## 2021-11-01 NOTE — PLAN OF CARE
Problem: Altered Mood, Depressive Behavior:  Goal: Able to verbalize acceptance of life and situations over which he or she has no control  Description: Able to verbalize acceptance of life and situations over which he or she has no control  Outcome: Ongoing     Problem: Altered Mood, Depressive Behavior:  Goal: Ability to disclose and discuss suicidal ideas will improve  Description: Ability to disclose and discuss suicidal ideas will improve  10/31/2021 2223 by Rosendo Black RN  Outcome: Ongoing     Problem: Altered Mood, Depressive Behavior:  Goal: Absence of self-harm  Description: Absence of self-harm  Outcome: Ongoing   Pt refusing to speak to this nurse. Pt responds with a stair refusing to answer any questions. Pt took medications. Pt shows no signs or symptoms of distress and no dangerous behavior is noted. Will continue to monitor.

## 2021-11-01 NOTE — CARE COORDINATION
SW attempted to complete assessment, pt was refusing to answer questions. Pt's assessment is completed based off other documentation. Due to pt being in the hospital starting on 10/29/2021. Biopsychosocial Assessment Note    Social work met with patient to complete the biopsychosocial assessment and CSSR-S. Mental Status Exam: Pt is alert and oriented x3. Pt is not able to state the reasoning for this admission. Pt is having a blocked thought process and poverty of content. Pt's eye contact is poor, pt's speech is slow, volume is low, speech is muffled. Pt's insight and judgement is poor. Pt's mood is depressed, affect is flat, incongruent. Pt is internally stimulated. KALIE SI, HI, AVH, DOA, Legal history, or abuse history. Chief Complaint: Per ED SW note \"The pt is a 22 yr old white male who was pink slipped by the [address] [place] [address] . \"    Patient Report: SW was unable to complete a full assessment. Pt reported that he plans on DC home to his mother and 3 brothers that he lives with. Pt is unsure if his mother will be able to come and get him. Pt stated that he has talked to his mother since coming to the hospital but he is unsure what her number is and if he called her or she called him. Pt reported that his mother's name is Sacha Vallejo but he does not know what her number is. Pt has no emergency contact for his mother in the facesheet. Community Navigator was able to determine mothers phone number, but pt refused to acknowledge SW when SW was attempting to get VICKEY signed. WINTER provided pt with mothers number to contact if needed. Pt stated that he is seeing a counselor at [address] [place] [address]. When SW would ask pt questions he would only answer with yes or no. Per other documentation pt was pink slipped by [facility] counseling  due to severe depression to the point of not communicating his thoughts and feelings. Pt has not been keeping up with personal hygiene.  Pt was denying SI, HI but admitted to having access to a firearm. Per psychiatrist note pt is denying having access to weapons. Pt did not disclose any information about weapons to this writer. 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 Gender  [x] Male [] Female [] Transgender  [] Other    Sexual Orientation    [] Heterosexual [] Homosexual [] Bisexual [x] Other- KALIE    Suicidal Ideation  [] Past [] Present [x] Denies     Homicidal Ideation  [] Past [] Present [x] Denies     Hallucinations/Delusions (Specify type)  [] Reports [x] Denies     Substance Use/Alcohol Use/Addiction  [] Reports [x] Denies     Tobacco Use (within the last 6 months)  [] Reports [x] Denies     Trauma History  [] Reports [] Denies  KALIE    Collateral Contact (VICKEY signed)  Name:   Relationship:  Number:     Collateral Information:        Access to Weapons per Collateral Contact: [] Reports [] Denies       Follow up provider preference: [employer] for counseling or agency for meds and counseling, pt will need medications. Plan for discharge  Location (where do they plan on discharging to?): Home with mom and 3 brothers    Transportation (who will pick them up at discharge?) KALIE- possibly mom     Medications (will they have money for copays at discharge?): KALIE at this time.

## 2021-11-01 NOTE — PROGRESS NOTES
BEHAVIORAL HEALTH FOLLOW-UP NOTE     11/1/2021     Patient was seen and examined in person, Chart reviewed   Patient's case discussed with staff/team      Patient personally seen and examined by me and mental status exam performed. I agree the below assessment by the medical student. Psychomotor evaluation revealed no agitation retardation any abnormal movements. His eye contact is poor his speech is normal rate rhythm and tone. His mood is \"I I am fine. \"  Affect is mood incongruent extremely irritable angry almost hostile his thought process is linear without flight of ideas loose associations. Thought contents devoid of any auditory visual hallucinations but he peers paranoid and guarded. He denies suicidal homicidal ideations intent or plan impulse control is adequate cognitive function peers to be his baseline his insight judgment is poor he is alert oriented time place and person       Chief Complaint: Irritable, hostile    Interim History:     Patient evaluated in treatment team this morning, he is AAOx3, hostile and flat on assessment with intense eye contact. Offering \"yes\" and \"no\" responses to questions, he does not elaborate on any response. Pt stating he is \"fine\" and that he does not feel changed from day of admission. Pt is upset that he is here and states that he was pink slipped by [facility] counseling for reasons he does not know or understand. He is guarded and irritated. Pt states he does not hear voices but appears internally stimulated. Pt continuing to show poor insight into circumstances of admission. The patient is taking his medications and reports no side effects. Pt agreeable for mother to be contacted for collateral. Pt denies SI, HI and AVH at this time. Assessment is limited.      Appetite:   [x] Normal/Unchanged  [] Increased  [] Decreased      Sleep:       [x] Normal/Unchanged  [] Fair       [] Poor              Energy:    [x] Normal/Unchanged  [] Increased  [] Decreased        SI [] Present  [x] Absent   HI  []Present  [x] Absent     Aggression:  [] yes  [x] no    Patient is [] able  [] unable to CONTRACT FOR SAFETY     PAST MEDICAL/PSYCHIATRIC HISTORY:   History reviewed. No pertinent past medical history. FAMILY/SOCIAL HISTORY:  History reviewed. No pertinent family history. Social History     Socioeconomic History    Marital status: Single     Spouse name: Not on file    Number of children: Not on file    Years of education: Not on file    Highest education level: Not on file   Occupational History    Not on file   Tobacco Use    Smoking status: Never Smoker    Smokeless tobacco: Never Used   Vaping Use    Vaping Use: Never used   Substance and Sexual Activity    Alcohol use: Never    Drug use: Never    Sexual activity: Not on file   Other Topics Concern    Not on file   Social History Narrative    Not on file     Social Determinants of Health     Financial Resource Strain:     Difficulty of Paying Living Expenses:    Food Insecurity:     Worried About Running Out of Food in the Last Year:     [address] in the Last Year:    Transportation Needs:     Lack of Transportation (Medical):  Lack of Transportation (Non-Medical):    Physical Activity:     Days of Exercise per Week:     Minutes of Exercise per Session:    Stress:     Feeling of Stress :    Social Connections:     Frequency of Communication with Friends and Family:     Frequency of Social Gatherings with Friends and Family:     Attends [employer] Services:     Active Member of Clubs or Organizations:     Attends Club or Organization Meetings:     Marital Status:    Intimate Partner Violence:     Fear of Current or Ex-Partner:     Emotionally Abused:     Physically Abused:     Sexually Abused:            ROS:  [x] All negative/unchanged except if checked.  Explain positive(checked items) below:  [] Constitutional  [] Eyes  [] Ear/Nose/Mouth/Throat  [] Respiratory  [] CV  [] GI  []   [] Musculoskeletal  [] Skin/Breast  [] Neurological  [] Endocrine  [] Heme/Lymph  [] Allergic/Immunologic    Explanation:     MEDICATIONS:    Current Facility-Administered Medications:     OLANZapine (ZYPREXA) tablet 10 mg, 10 mg, Oral, Nightly, SHIRAZ Kiser - CNP    acetaminophen (TYLENOL) tablet 650 mg, 650 mg, Oral, Q6H PRN, Tammie Dodson MD    magnesium hydroxide (MILK OF MAGNESIA) 400 MG/5ML suspension 30 mL, 30 mL, Oral, Daily PRN, Tammie Dodson MD    aluminum & magnesium hydroxide-simethicone (MAALOX) 200-200-20 MG/5ML suspension 30 mL, 30 mL, Oral, PRN, Tammie Dodson MD    hydrOXYzine (VISTARIL) capsule 50 mg, 50 mg, Oral, TID PRN, Tammie Dodson MD    haloperidol (HALDOL) tablet 5 mg, 5 mg, Oral, Q6H PRN **OR** haloperidol lactate (HALDOL) injection 5 mg, 5 mg, IntraMUSCular, Q6H PRN, Tammie Dodson MD    traZODone (DESYREL) tablet 50 mg, 50 mg, Oral, Nightly PRN, Tammie Dodson MD    escitalopram (LEXAPRO) tablet 10 mg, 10 mg, Oral, Daily, Tammie Dodson MD, 10 mg at 11/01/21 1021      Examination:  /60   Pulse 50   Temp 97.8 °F (36.6 °C)   Resp 14   SpO2 96%   Gait - steady  Medication side effects(SE): none reported    Mental Status Examination:    Level of consciousness:  within normal limits   Appearance:  poor grooming and poor hygiene  Behavior/Motor:  psychomotor retardation  Attitude toward examiner:  hostile, guarded and withdrawn  Speech:  Whispered and poverty of speech   Mood: Irritable  Affect:  flat  Thought processes: incoherent  Thought content: paranoid  Cognition:  oriented to person, place, and time   Concentration poor  Insight poor   Judgement poor     ASSESSMENT:   Patient symptoms are:  [] Well controlled  [] Improving  [] Worsening  [x] No change      Diagnosis:   Active Problems:    MDD (major depressive disorder), single episode, severe with psychotic features ([facility] [address]  Resolved Problems:    * No resolved hospital problems. *      LABS:    Recent Labs     10/29/21  1727   WBC 6.2   HGB 16.0        Recent Labs     10/29/21  1727      K 4.7   CL 99   CO2 25   BUN 13   CREATININE 0.8   GLUCOSE 85     Recent Labs     10/29/21  1727   BILITOT 1.0   ALKPHOS 77   AST 19   ALT 15     Lab Results   Component Value Date    LABAMPH NOT DETECTED 10/29/2021    BARBSCNU NOT DETECTED 10/29/2021    LABBENZ NOT DETECTED 10/29/2021    LABMETH NOT DETECTED 10/29/2021    OPIATESCREENURINE NOT DETECTED 10/29/2021    PHENCYCLIDINESCREENURINE NOT DETECTED 10/29/2021    ETOH <10 10/29/2021     No results found for: TSH, FREET4  No results found for: LITHIUM  No results found for: VALPROATE, CBMZ        Treatment Plan:  The patient's diagnosis, treatment plan, medication management were formulated after patient was seen directly by the attending physician and myself and all relevant documentation was reviewed. Risk, benefit, side effects, possible outcomes of the medication and alternatives discussed with the patient and the patient demonstrated understanding. The patient was also educated that the outcome of treatment will depend on the medication compliance as directed by the prescribers along with regular follow-up, compliance with the labs and other work-up, as clinically indicated. Continue Lexapro 10 mg daily for depression anxiety  Increase Zyprexa from 5 mg to 10 mg nightly for psychotic feature      Collateral information: Followed by social work  CD evaluation  Encourage patient to attend group and other milieu activities.   Discharge planning discussed with the patient and treatment team.    PSYCHOTHERAPY/COUNSELING:  [x] Therapeutic interview  [x] Supportive  [] CBT   [] Ongoing  [] Other    [x] Patient continues to need, on a daily basis, active treatment furnished directly by or requiring the supervision of inpatient psychiatric personnel      Anticipated Length of stay: 7 to 10 days based on stability      Electronically signed by Rafiq Delarosa on 11/1/2021 at 1:13 PM

## 2021-11-01 NOTE — CARE COORDINATION
WINTER spoke with pt's mom jasmine [phone] that stated the pt has a hard time expressing his feelings and his father passed away in September and he has recently been having a hard time in school. Mom stated that the pt gets very upset if he does not succeed in what he wants and currently he is not doing well in school. Mom stated that the pt always wants to better himself as best as he can. Mom reported that the pt has never seen a counselor/ psychiatrist before going to [facility] for counseling and has no previous psych history. Mom thinks it will be beneficial for the pt to open up and talk to someone about what is going on between the loss of his father and school stress. Mom stated that the pt does live with her and he is able to return back to the home. Mom stated that she believes the gun the pt was discussing in the gun in the home. Mom stated that the gun is locked up and the pt does not have access to it. Mom has no concerns for pt DC when he is stable. Mom stated the pt is not active with mental health in the community and he is able to go back to [facility] for counseling or to schedule the pt in [facility] for appointments.

## 2021-11-01 NOTE — CARE COORDINATION
Pt signed VICKEY for mother Thom Monteiro [phone]. Collateral needs obtained. Pt came into treatment team, pt has hostile, and flat, pt reported that he feels \"fine\" and that he feels the same as when he came into the hospital. Pt is upset that he is here. Pt denied AH, stated that he was pink slipped by [facility] but he is not sure why.

## 2021-11-01 NOTE — BH NOTE
[facility]  Day 3 Interdisciplinary Treatment Plan NOTE    Review Date & Time: 11/1/21 1150    Patient was in treatment team    Estimated Length of Stay Update:  5-7  Estimated Discharge Date Update: 11/8    EDUCATION:   Learner Progress Toward Treatment Goals: Reviewed results and recommendations of this team    Method: Small group    Outcome: Needs reinforcement and No evidence of Learning    PATIENT GOALS: Feel better    PLAN/TREATMENT RECOMMENDATIONS UPDATE:Attend groups and take medications    GOALS UPDATE:   Time frame for Short-Term Goals: 1-3      Claudeen Darner, RN

## 2021-11-02 PROCEDURE — 6370000000 HC RX 637 (ALT 250 FOR IP): Performed by: PSYCHIATRY & NEUROLOGY

## 2021-11-02 PROCEDURE — 6370000000 HC RX 637 (ALT 250 FOR IP): Performed by: NURSE PRACTITIONER

## 2021-11-02 PROCEDURE — 99232 SBSQ HOSP IP/OBS MODERATE 35: CPT | Performed by: NURSE PRACTITIONER

## 2021-11-02 PROCEDURE — 1240000000 HC EMOTIONAL WELLNESS R&B

## 2021-11-02 RX ORDER — OXCARBAZEPINE 300 MG/1
300 TABLET, FILM COATED ORAL 2 TIMES DAILY
Status: DISCONTINUED | OUTPATIENT
Start: 2021-11-02 | End: 2021-11-08 | Stop reason: HOSPADM

## 2021-11-02 RX ADMIN — OXCARBAZEPINE 300 MG: 300 TABLET, FILM COATED ORAL at 13:10

## 2021-11-02 RX ADMIN — OLANZAPINE 10 MG: 10 TABLET, FILM COATED ORAL at 21:18

## 2021-11-02 RX ADMIN — OXCARBAZEPINE 300 MG: 300 TABLET, FILM COATED ORAL at 21:18

## 2021-11-02 RX ADMIN — ESCITALOPRAM 10 MG: 10 TABLET, FILM COATED ORAL at 08:51

## 2021-11-02 ASSESSMENT — PAIN SCALES - GENERAL: PAINLEVEL_OUTOF10: 0

## 2021-11-02 NOTE — BH NOTE
Out in day room complied with HS meds won't answer assessment questions evasive underlying irritability noted emotional support given

## 2021-11-02 NOTE — PROGRESS NOTES
Attended afternoon meet and greet. Aware of evening changes and updates. Pleasant during leisure activity of game stations. Was 1 of 10 in attendance.

## 2021-11-02 NOTE — CARE COORDINATION
WINTER spoke with Ap Thompson at [address] [place] [address] services requesting to set pt up with an appointment. WINTER is unsure of pt DC date and will call Ap Thompson when date is known. [phone].

## 2021-11-03 PROCEDURE — 6370000000 HC RX 637 (ALT 250 FOR IP): Performed by: NURSE PRACTITIONER

## 2021-11-03 PROCEDURE — 1240000000 HC EMOTIONAL WELLNESS R&B

## 2021-11-03 PROCEDURE — 99232 SBSQ HOSP IP/OBS MODERATE 35: CPT | Performed by: NURSE PRACTITIONER

## 2021-11-03 RX ORDER — OLANZAPINE 10 MG/1
15 TABLET ORAL NIGHTLY
Status: DISCONTINUED | OUTPATIENT
Start: 2021-11-03 | End: 2021-11-08 | Stop reason: HOSPADM

## 2021-11-03 RX ADMIN — OLANZAPINE 15 MG: 10 TABLET, FILM COATED ORAL at 20:47

## 2021-11-03 RX ADMIN — OXCARBAZEPINE 300 MG: 300 TABLET, FILM COATED ORAL at 20:47

## 2021-11-03 RX ADMIN — OXCARBAZEPINE 300 MG: 300 TABLET, FILM COATED ORAL at 08:38

## 2021-11-03 ASSESSMENT — PAIN SCALES - GENERAL: PAINLEVEL_OUTOF10: 0

## 2021-11-03 ASSESSMENT — PAIN - FUNCTIONAL ASSESSMENT: PAIN_FUNCTIONAL_ASSESSMENT: 0-10

## 2021-11-03 NOTE — PLAN OF CARE
Problem: Altered Mood, Depressive Behavior:  Goal: Able to verbalize acceptance of life and situations over which he or she has no control  Description: Able to verbalize acceptance of life and situations over which he or she has no control  Outcome: Ongoing     Problem: Altered Mood, Depressive Behavior:  Goal: Ability to disclose and discuss suicidal ideas will improve  Description: Ability to disclose and discuss suicidal ideas will improve  Outcome: Ongoing     Problem: Altered Mood, Depressive Behavior:  Goal: Absence of self-harm  Description: Absence of self-harm  Outcome: Ongoing     Problem: Altered Mood, Depressive Behavior:  Goal: Participates in care planning  Description: Participates in care planning  Outcome: Ongoing     Pt has been withdrawn to his room all evening. Pt is alert and oriented x 3, irritable and guarded. Pt denies any thoughts of suicide or homicide at this time and no dangerous behavior is noted. Pt denies any visual or auditory hallucinations and no visual or auditory hallucinations are noted.

## 2021-11-03 NOTE — BH NOTE
Pt is stable and alert. Pt denies suicidal or homicidal ideations. Pt denies hallucinations. Pt is without other distress. Remains flat and quiet, poverty of content. Will follow and monitor.

## 2021-11-03 NOTE — GROUP NOTE
Group Therapy Note    Date: 11/3/2021    Group Start Time: 1000  Group End Time: 1030  Group Topic: Psychoeducation    [facility]    Mecca Anders, CTRS        Group Therapy Note    Number of participants: 6  Type of group: Psychoeducation  Mode of intervention: Education, Support, Socialization, Exploration, Clarifying, and Problem-solving  Topic: Self Management Skills  Objective: Pt will identify 3 ways to regulate and control their actions, feelings, and thoughts           Patient's Goal:  Pt stated no goal.     Notes:  Pt interactive during group sharing 3 ways to regulate and control actions, feelings, and thoughts. Pt gave support and feedback to others. Status After Intervention:  Improved    Participation Level:  Active Listener and Interactive    Participation Quality: Appropriate, Attentive, Sharing and Supportive      Speech:  normal      Thought Process/Content: Logical      Affective Functioning: Flat      Mood: depressed      Level of consciousness:  Alert, Oriented x4 and Attentive      Response to Learning: Able to verbalize current knowledge/experience, Able to verbalize/acknowledge new learning, Able to retain information, Capable of insight, Able to change behavior and Progressing to goal      Endings: None Reported    Modes of Intervention: Education, Support, Socialization, Exploration, Clarifying and Problem-solving

## 2021-11-03 NOTE — PLAN OF CARE
Pt is without immediate distress. Pt denies suicidal or homicidal ideations. Pt denies hallucinations. Pt is isolative to room, though has been out in the day room at times. Pt maintains an avoidant eye contact, flat affect, ambivalent, un engaged, poverty of speech and content. Pt does not report a goal during first morning assessment. Will follow and monitor.

## 2021-11-03 NOTE — GROUP NOTE
Group Therapy Note    Date: 11/3/2021    Group Start Time: 1115  Group End Time: 4812  Group Topic: Cognitive Skills    [facility]    MARA Quinn LSW        Group Therapy Note    Attendees: 11         Patient's Goal:  Patient will understand and verbalize positive traits    Notes:  Patient attended and participated well    Status After Intervention:  Improved    Participation Level:  Active Listener    Participation Quality: Appropriate      Speech:  normal      Thought Process/Content: Logical  Linear      Affective Functioning: Congruent      Mood: anxious      Level of consciousness:  Alert and Oriented x4      Response to Learning: Able to verbalize current knowledge/experience      Endings: None Reported    Modes of Intervention: Education, Support, Socialization, Exploration, Clarifying and Problem-solving      Discipline Responsible: /Counselor      Signature:  MARA Quinn LSW

## 2021-11-03 NOTE — PROGRESS NOTES
BEHAVIORAL HEALTH FOLLOW-UP NOTE     11/3/2021     Patient was seen and examined in person, Chart reviewed   Patient's case discussed with staff/team       Chief Complaint: Irritable, hostile    Interim History:   Patient upon the unit still makes poor eye contact very irritable affect states he has not talked to his family. He is isolative guarded and suspicious he offers very little conversation remains irritable easily agitated denies SI/HI intent or plan denies auditory visualizations however he makes no eye contact very withdrawn does not socialize with peers or attend groups appears paranoid guarded still has not spoken to any family members when asked why not he refuses to say long latency of response possibly internally stimulated            Appetite:   [x] Normal/Unchanged  [] Increased  [] Decreased      Sleep:       [x] Normal/Unchanged  [] Fair       [] Poor              Energy:    [x] Normal/Unchanged  [] Increased  [] Decreased        SI [] Present  [x] Absent   HI  []Present  [x] Absent     Aggression:  [] yes  [x] no    Patient is [] able  [] unable to CONTRACT FOR SAFETY     PAST MEDICAL/PSYCHIATRIC HISTORY:   History reviewed. No pertinent past medical history. FAMILY/SOCIAL HISTORY:  History reviewed. No pertinent family history.   Social History     Socioeconomic History    Marital status: Single     Spouse name: Not on file    Number of children: Not on file    Years of education: Not on file    Highest education level: Not on file   Occupational History    Not on file   Tobacco Use    Smoking status: Never Smoker    Smokeless tobacco: Never Used   Vaping Use    Vaping Use: Never used   Substance and Sexual Activity    Alcohol use: Never    Drug use: Never    Sexual activity: Not on file   Other Topics Concern    Not on file   Social History Narrative    Not on file     Social Determinants of Health     Financial Resource Strain:     Difficulty of Paying Living Expenses:    Food Insecurity:     Worried About Running Out of Food in the Last Year:     [address] in the Last Year:    Transportation Needs:     Lack of Transportation (Medical):  Lack of Transportation (Non-Medical):    Physical Activity:     Days of Exercise per Week:     Minutes of Exercise per Session:    Stress:     Feeling of Stress :    Social Connections:     Frequency of Communication with Friends and Family:     Frequency of Social Gatherings with Friends and Family:     Attends [employer] Services:     Active Member of Clubs or Organizations:     Attends Club or Organization Meetings:     Marital Status:    Intimate Partner Violence:     Fear of Current or Ex-Partner:     Emotionally Abused:     Physically Abused:     Sexually Abused:            ROS:  [x] All negative/unchanged except if checked.  Explain positive(checked items) below:  [] Constitutional  [] Eyes  [] Ear/Nose/Mouth/Throat  [] Respiratory  [] CV  [] GI  []   [] Musculoskeletal  [] Skin/Breast  [] Neurological  [] Endocrine  [] Heme/Lymph  [] Allergic/Immunologic    Explanation:     MEDICATIONS:    Current Facility-Administered Medications:     OLANZapine (ZYPREXA) tablet 15 mg, 15 mg, Oral, Nightly, Rose Marie B SHIRAZ Casper - CNP    OXcarbazepine (TRILEPTAL) tablet 300 mg, 300 mg, Oral, BID, Demi Lung SHIRAZ Casper CNP, 156 mg at 11/03/21 1129    acetaminophen (TYLENOL) tablet 650 mg, 650 mg, Oral, Q6H PRN, Ramiro Sánchez MD    magnesium hydroxide (MILK OF MAGNESIA) 400 MG/5ML suspension 30 mL, 30 mL, Oral, Daily PRN, Ramiro Sánchez MD    aluminum & magnesium hydroxide-simethicone (MAALOX) 200-200-20 MG/5ML suspension 30 mL, 30 mL, Oral, PRN, Ramiro Sánchez MD    hydrOXYzine (VISTARIL) capsule 50 mg, 50 mg, Oral, TID PRN, Ramiro Sánchez MD    haloperidol (HALDOL) tablet 5 mg, 5 mg, Oral, Q6H PRN **OR** haloperidol lactate (HALDOL) injection 5 mg, 5 mg, IntraMUSCular, Q6H PRN, Ramiro Sánchez MD    traZODone (DESYREL) tablet 50 mg, 50 mg, Oral, Nightly PRN, Jazmyne Molina MD      Examination:  BP (!) 110/57   Pulse 50   Temp 97.3 °F (36.3 °C) (Temporal)   Resp 15   SpO2 96%   Gait - steady  Medication side effects(SE): none reported    Mental Status Examination:    Level of consciousness:  within normal limits   Appearance:  poor grooming and poor hygiene  Behavior/Motor:  psychomotor retardation  Attitude toward examiner:  hostile, guarded and withdrawn  Speech:  Whispered and poverty of speech   Mood: Irritable  Affect:  flat  Thought processes: incoherent  Thought content: paranoid  Cognition:  oriented to person, place, and time   Concentration poor  Insight poor   Judgement poor     ASSESSMENT:   Patient symptoms are:  [] Well controlled  [] Improving  [] Worsening  [x] No change      Diagnosis:   Active Problems:    MDD (major depressive disorder), single episode[facility]  Resolved Problems:    * No resolved hospital problems. *      LABS:    No results for input(s): WBC, HGB, PLT in the last 72 hours. No results for input(s): NA, K, CL, CO2, BUN, CREATININE, GLUCOSE in the last 72 hours. No results for input(s): BILITOT, ALKPHOS, AST, ALT in the last 72 hours. Lab Results   Component Value Date    LABAMPH NOT DETECTED 10/29/2021    BARBSCNU NOT DETECTED 10/29/2021    LABBENZ NOT DETECTED 10/29/2021    LABMETH NOT DETECTED 10/29/2021    OPIATESCREENURINE NOT DETECTED 10/29/2021    PHENCYCLIDINESCREENURINE NOT DETECTED 10/29/2021    ETOH <10 10/29/2021     No results found for: TSH, FREET4  No results found for: LITHIUM  No results found for: VALPROATE, CBMZ        Treatment Plan:  The patient's diagnosis, treatment plan, medication management were formulated after patient was seen directly by the attending physician and myself and all relevant documentation was reviewed.       Risk, benefit, side effects, possible outcomes of the medication and alternatives discussed with the patient and the patient demonstrated understanding. The patient was also educated that the outcome of treatment will depend on the medication compliance as directed by the prescribers along with regular follow-up, compliance with the labs and other work-up, as clinically indicated. Increase Zyprexa to 15 mg nightly for psychotic feature  Start Trileptal 300 mg twice daily for mood stabilization    Collateral information: Followed by social work  CD evaluation  Encourage patient to attend group and other milieu activities.   Discharge planning discussed with the patient and treatment team.    PSYCHOTHERAPY/COUNSELING:  [x] Therapeutic interview  [x] Supportive  [] CBT   [] Ongoing  [] Other    [x] Patient continues to need, on a daily basis, active treatment furnished directly by or requiring the supervision of inpatient psychiatric personnel      Anticipated Length of stay: 7 to 10 days based on stability      Electronically signed by SHIRAZ Simental CNP on 43/7/9866 at 6:03 PM

## 2021-11-03 NOTE — PROGRESS NOTES
BEHAVIORAL HEALTH FOLLOW-UP NOTE     11/3/2021     Patient was seen and examined in person, Chart reviewed   Patient's case discussed with staff/team       Chief Complaint: Irritable, hostile    Interim History:   Patient upon the unit still makes poor eye contact very irritable affect states he has not talked to his family. He is isolative guarded and suspicious he offers very little conversation remains irritable easily agitated denies SI/HI intent or plan denies auditory visualizations however he makes no eye contact very withdrawn does not socialize with peers or attend groups appears paranoid guarded            Appetite:   [x] Normal/Unchanged  [] Increased  [] Decreased      Sleep:       [x] Normal/Unchanged  [] Fair       [] Poor              Energy:    [x] Normal/Unchanged  [] Increased  [] Decreased        SI [] Present  [x] Absent   HI  []Present  [x] Absent     Aggression:  [] yes  [x] no    Patient is [] able  [] unable to CONTRACT FOR SAFETY     PAST MEDICAL/PSYCHIATRIC HISTORY:   History reviewed. No pertinent past medical history. FAMILY/SOCIAL HISTORY:  History reviewed. No pertinent family history.   Social History     Socioeconomic History    Marital status: Single     Spouse name: Not on file    Number of children: Not on file    Years of education: Not on file    Highest education level: Not on file   Occupational History    Not on file   Tobacco Use    Smoking status: Never Smoker    Smokeless tobacco: Never Used   Vaping Use    Vaping Use: Never used   Substance and Sexual Activity    Alcohol use: Never    Drug use: Never    Sexual activity: Not on file   Other Topics Concern    Not on file   Social History Narrative    Not on file     Social Determinants of Health     Financial Resource Strain:     Difficulty of Paying Living Expenses:    Food Insecurity:     Worried About Running Out of Food in the Last Year:     [address] in the Last Year:    Transportation Needs:  Lack of Transportation (Medical):  Lack of Transportation (Non-Medical):    Physical Activity:     Days of Exercise per Week:     Minutes of Exercise per Session:    Stress:     Feeling of Stress :    Social Connections:     Frequency of Communication with Friends and Family:     Frequency of Social Gatherings with Friends and Family:     Attends Worship Services:     Active Member of Clubs or Organizations:     Attends Club or Organization Meetings:     Marital Status:    Intimate Partner Violence:     Fear of Current or Ex-Partner:     Emotionally Abused:     Physically Abused:     Sexually Abused:            ROS:  [x] All negative/unchanged except if checked.  Explain positive(checked items) below:  [] Constitutional  [] Eyes  [] Ear/Nose/Mouth/Throat  [] Respiratory  [] CV  [] GI  []   [] Musculoskeletal  [] Skin/Breast  [] Neurological  [] Endocrine  [] Heme/Lymph  [] Allergic/Immunologic    Explanation:     MEDICATIONS:    Current Facility-Administered Medications:     OXcarbazepine (TRILEPTAL) tablet 300 mg, 300 mg, Oral, BID, Karen Casper, APRN - CNP, 498 mg at 11/03/21 0838    OLANZapine (ZYPREXA) tablet 10 mg, 10 mg, Oral, Nightly, Karen Casper, APRN - CNP, 10 mg at 11/02/21 2118    acetaminophen (TYLENOL) tablet 650 mg, 650 mg, Oral, Q6H PRN, Renay Cheadle, MD    magnesium hydroxide (MILK OF MAGNESIA) 400 MG/5ML suspension 30 mL, 30 mL, Oral, Daily PRN, Renay Cheadle, MD    aluminum & magnesium hydroxide-simethicone (MAALOX) 200-200-20 MG/5ML suspension 30 mL, 30 mL, Oral, PRN, Renay Cheadle, MD    hydrOXYzine (VISTARIL) capsule 50 mg, 50 mg, Oral, TID PRN, Renay Cheadle, MD    haloperidol (HALDOL) tablet 5 mg, 5 mg, Oral, Q6H PRN **OR** haloperidol lactate (HALDOL) injection 5 mg, 5 mg, IntraMUSCular, Q6H PRN, Renay Cheadle, MD    traZODone (DESYREL) tablet 50 mg, 50 mg, Oral, Nightly PRN, Renay Cheadle, MD      Examination:  BP (!) 110/57   Pulse 50 Temp 97.3 °F (36.3 °C) (Temporal)   Resp 15   SpO2 96%   Gait - steady  Medication side effects(SE): none reported    Mental Status Examination:    Level of consciousness:  within normal limits   Appearance:  poor grooming and poor hygiene  Behavior/Motor:  psychomotor retardation  Attitude toward examiner:  hostile, guarded and withdrawn  Speech:  Whispered and poverty of speech   Mood: Irritable  Affect:  flat  Thought processes: incoherent  Thought content: paranoid  Cognition:  oriented to person, place, and time   Concentration poor  Insight poor   Judgement poor     ASSESSMENT:   Patient symptoms are:  [] Well controlled  [] Improving  [] Worsening  [x] No change      Diagnosis:   Active Problems:    MDD (major depressive disorder), single episode, severe with psychotic features ([facility] [address]  Resolved Problems:    * No resolved hospital problems. *      LABS:    No results for input(s): WBC, HGB, PLT in the last 72 hours. No results for input(s): NA, K, CL, CO2, BUN, CREATININE, GLUCOSE in the last 72 hours. No results for input(s): BILITOT, ALKPHOS, AST, ALT in the last 72 hours. Lab Results   Component Value Date    LABAMPH NOT DETECTED 10/29/2021    BARBSCNU NOT DETECTED 10/29/2021    LABBENZ NOT DETECTED 10/29/2021    LABMETH NOT DETECTED 10/29/2021    OPIATESCREENURINE NOT DETECTED 10/29/2021    PHENCYCLIDINESCREENURINE NOT DETECTED 10/29/2021    ETOH <10 10/29/2021     No results found for: TSH, FREET4  No results found for: LITHIUM  No results found for: VALPROATE, CBMZ        Treatment Plan:  The patient's diagnosis, treatment plan, medication management were formulated after patient was seen directly by the attending physician and myself and all relevant documentation was reviewed. Risk, benefit, side effects, possible outcomes of the medication and alternatives discussed with the patient and the patient demonstrated understanding.   The patient was also educated that the outcome of treatment will depend on the medication compliance as directed by the prescribers along with regular follow-up, compliance with the labs and other work-up, as clinically indicated. Discontinue Lexapro 10 mg daily for depression anxiety  Continue Zyprexa from 5 mg to 10 mg nightly for psychotic feature  Start Trileptal 3 mg twice daily for mood stabilization    Collateral information: Followed by social work  CD evaluation  Encourage patient to attend group and other milieu activities.   Discharge planning discussed with the patient and treatment team.    PSYCHOTHERAPY/COUNSELING:  [x] Therapeutic interview  [x] Supportive  [] CBT   [] Ongoing  [] Other    [x] Patient continues to need, on a daily basis, active treatment furnished directly by or requiring the supervision of inpatient psychiatric personnel      Anticipated Length of stay: 7 to 10 days based on stability      Electronically signed by SHIRAZ Pierce CNP on 42/8/7245 at 2:25 PM

## 2021-11-04 PROCEDURE — 1240000000 HC EMOTIONAL WELLNESS R&B

## 2021-11-04 PROCEDURE — 99232 SBSQ HOSP IP/OBS MODERATE 35: CPT | Performed by: NURSE PRACTITIONER

## 2021-11-04 PROCEDURE — 6370000000 HC RX 637 (ALT 250 FOR IP): Performed by: NURSE PRACTITIONER

## 2021-11-04 RX ADMIN — OLANZAPINE 15 MG: 10 TABLET, FILM COATED ORAL at 20:13

## 2021-11-04 RX ADMIN — OXCARBAZEPINE 300 MG: 300 TABLET, FILM COATED ORAL at 08:17

## 2021-11-04 RX ADMIN — OXCARBAZEPINE 300 MG: 300 TABLET, FILM COATED ORAL at 20:13

## 2021-11-04 ASSESSMENT — PAIN SCALES - GENERAL
PAINLEVEL_OUTOF10: 0
PAINLEVEL_OUTOF10: 0

## 2021-11-04 ASSESSMENT — PAIN - FUNCTIONAL ASSESSMENT
PAIN_FUNCTIONAL_ASSESSMENT: 0-10
PAIN_FUNCTIONAL_ASSESSMENT: 0-10

## 2021-11-04 NOTE — PLAN OF CARE
Pt is without immediate distress. Pt denies suicidal or homicidal ideations. Pt denies hallucinations. Pt does not report a goal during first morning assessment. Pt is more brightened and is more communicative with more thoughts expressed and more sentences used to talk to another. Pt does not appear as depressed at this time. No other concerns expressed at this time. Will follow and monitor.

## 2021-11-04 NOTE — GROUP NOTE
Group Therapy Note    Date: 11/4/2021    Group Start Time: 1010  Group End Time: 1467  Group Topic: Psychoeducation    [facility]    Mecca Anders, CTRS        Group Therapy Note    Number of participants: 15  Type of group: Psychoeducation  Mode of intervention: Education, Support, Socialization, Exploration, Clarifying, and Problem-solving  Topic: Values Exploration   Objective: Pt will identify what values are important to them in recovery and how to apply them. Patient's Goal:  Pt reports \"I don't know\"     Notes:   Pt was interactive during group sharing what values are important in recovery and shared ways to apply values in recovery. Status After Intervention:  Improved    Participation Level:  Active Listener and Interactive    Participation Quality: Appropriate, Attentive, Sharing and Supportive      Speech:  normal      Thought Process/Content: Logical      Affective Functioning: Congruent      Mood: euthymic      Level of consciousness:  Alert, Oriented x4 and Attentive      Response to Learning: Able to verbalize current knowledge/experience, Able to verbalize/acknowledge new learning, Able to retain information, Capable of insight, Able to change behavior and Progressing to goal      Endings: None Reported    Modes of Intervention: Education, Support, Socialization, Exploration, Clarifying and Problem-solving

## 2021-11-04 NOTE — PROGRESS NOTES
BEHAVIORAL HEALTH FOLLOW-UP NOTE     11/4/2021     Patient was seen and examined in person, Chart reviewed   Patient's case discussed with staff/team       Chief Complaint: \"I am feeling better. \"  Interim History:   Patient upon the unit he makes better eye contact today. He denies SI/HI intent or plan denies auditory visualizations he feels readiness for discharge still has not talked to his mother. He is out visible in the milieu watching television he is attending some groups he is strongly encouraged to speak with his mother's we can move forward with discharge planning          Appetite:   [x] Normal/Unchanged  [] Increased  [] Decreased      Sleep:       [x] Normal/Unchanged  [] Fair       [] Poor              Energy:    [x] Normal/Unchanged  [] Increased  [] Decreased        SI [] Present  [x] Absent   HI  []Present  [x] Absent     Aggression:  [] yes  [x] no    Patient is [] able  [] unable to CONTRACT FOR SAFETY     PAST MEDICAL/PSYCHIATRIC HISTORY:   History reviewed. No pertinent past medical history. FAMILY/SOCIAL HISTORY:  History reviewed. No pertinent family history.   Social History     Socioeconomic History    Marital status: Single     Spouse name: Not on file    Number of children: Not on file    Years of education: Not on file    Highest education level: Not on file   Occupational History    Not on file   Tobacco Use    Smoking status: Never Smoker    Smokeless tobacco: Never Used   Vaping Use    Vaping Use: Never used   Substance and Sexual Activity    Alcohol use: Never    Drug use: Never    Sexual activity: Not on file   Other Topics Concern    Not on file   Social History Narrative    Not on file     Social Determinants of Health     Financial Resource Strain:     Difficulty of Paying Living Expenses:    Food Insecurity:     Worried About Running Out of Food in the Last Year:     [address] in the Last Year:    Transportation Needs:     Lack of Transportation (Medical):  Lack of Transportation (Non-Medical):    Physical Activity:     Days of Exercise per Week:     Minutes of Exercise per Session:    Stress:     Feeling of Stress :    Social Connections:     Frequency of Communication with Friends and Family:     Frequency of Social Gatherings with Friends and Family:     Attends [employer] Services:     Active Member of Clubs or Organizations:     Attends Club or Organization Meetings:     Marital Status:    Intimate Partner Violence:     Fear of Current or Ex-Partner:     Emotionally Abused:     Physically Abused:     Sexually Abused:            ROS:  [x] All negative/unchanged except if checked.  Explain positive(checked items) below:  [] Constitutional  [] Eyes  [] Ear/Nose/Mouth/Throat  [] Respiratory  [] CV  [] GI  []   [] Musculoskeletal  [] Skin/Breast  [] Neurological  [] Endocrine  [] Heme/Lymph  [] Allergic/Immunologic    Explanation:     MEDICATIONS:    Current Facility-Administered Medications:     OLANZapine (ZYPREXA) tablet 15 mg, 15 mg, Oral, Nightly, SHIRAZ Stock - CNP, 15 mg at 11/03/21 2047    OXcarbazepine (TRILEPTAL) tablet 300 mg, 300 mg, Oral, BID, SHIRAZ Stock - CNP, 287 mg at 11/04/21 0817    acetaminophen (TYLENOL) tablet 650 mg, 650 mg, Oral, Q6H PRN, Manpreet Luong MD    magnesium hydroxide (MILK OF MAGNESIA) 400 MG/5ML suspension 30 mL, 30 mL, Oral, Daily PRN, Manpreet Luong MD    aluminum & magnesium hydroxide-simethicone (MAALOX) 200-200-20 MG/5ML suspension 30 mL, 30 mL, Oral, PRN, Manpreet Luong MD    hydrOXYzine (VISTARIL) capsule 50 mg, 50 mg, Oral, TID PRN, Manpreet Luong MD    haloperidol (HALDOL) tablet 5 mg, 5 mg, Oral, Q6H PRN **OR** haloperidol lactate (HALDOL) injection 5 mg, 5 mg, IntraMUSCular, Q6H PRN, Manpreet Luong MD    traZODone (DESYREL) tablet 50 mg, 50 mg, Oral, Nightly PRN, Manpreet Luong MD      Examination:  BP 97/62   Pulse 63   Temp 98.1 °F (36.7 °C) (Oral)   Resp 14   SpO2 97%   Gait - steady  Medication side effects(SE): none reported    Mental Status Examination:    Level of consciousness:  within normal limits   Appearance:  poor grooming and poor hygiene  Behavior/Motor:  psychomotor retardation  Attitude toward examiner:  hostile, guarded and withdrawn  Speech:  Whispered and poverty of speech   Mood: Irritable  Affect:  flat  Thought processes: incoherent  Thought content: paranoid  Cognition:  oriented to person, place, and time   Concentration poor  Insight poor   Judgement poor     ASSESSMENT:   Patient symptoms are:  [] Well controlled  [] Improving  [] Worsening  [x] No change      Diagnosis:   Active Problems:    MDD (major depressive disorder), single episode, severe with psychotic features ([facility] [address]  Resolved Problems:    * No resolved hospital problems. *      LABS:    No results for input(s): WBC, HGB, PLT in the last 72 hours. No results for input(s): NA, K, CL, CO2, BUN, CREATININE, GLUCOSE in the last 72 hours. No results for input(s): BILITOT, ALKPHOS, AST, ALT in the last 72 hours. Lab Results   Component Value Date    LABAMPH NOT DETECTED 10/29/2021    BARBSCNU NOT DETECTED 10/29/2021    LABBENZ NOT DETECTED 10/29/2021    LABMETH NOT DETECTED 10/29/2021    OPIATESCREENURINE NOT DETECTED 10/29/2021    PHENCYCLIDINESCREENURINE NOT DETECTED 10/29/2021    ETOH <10 10/29/2021     No results found for: TSH, FREET4  No results found for: LITHIUM  No results found for: VALPROATE, CBMZ        Treatment Plan:  The patient's diagnosis, treatment plan, medication management were formulated after patient was seen directly by the attending physician and myself and all relevant documentation was reviewed. Risk, benefit, side effects, possible outcomes of the medication and alternatives discussed with the patient and the patient demonstrated understanding.   The patient was also educated that the outcome of treatment will depend on the medication compliance as directed by the prescribers along with regular follow-up, compliance with the labs and other work-up, as clinically indicated. Continue Zyprexa to 15 mg nightly for psychotic feature  Continue Trileptal 300 mg twice daily for mood stabilization    Collateral information: Followed by social work  CD evaluation  Encourage patient to attend group and other milieu activities.   Discharge planning discussed with the patient and treatment team.    PSYCHOTHERAPY/COUNSELING:  [x] Therapeutic interview  [x] Supportive  [] CBT   [] Ongoing  [] Other    [x] Patient continues to need, on a daily basis, active treatment furnished directly by or requiring the supervision of inpatient psychiatric personnel      Anticipated Length of stay: 7 to 10 days based on stability      Electronically signed by SHIRAZ Gastelum CNP on 22/6/7597 at 12:48 PM

## 2021-11-04 NOTE — BH NOTE
Pt denies suicidal / homicidal ideations. Pt denies hallucinations. Pt is cooperative. Pt is without behavioral complications. Will follow and monitor.

## 2021-11-04 NOTE — PLAN OF CARE
Patient denies suicidal ideation, homicidal ideations and AVH. Patient denies anxiety and depression. Patient flat, anxious and depressed with poor eye contact and blunt responses. Presents calm and cooperative during assessment. Patient is out on the unit but does not appear to be social with peers. Medications taken without issue. No complaints or concerns verbalized at this time. No unit problems reported. Will continue to observe and support.     Problem: Altered Mood, Depressive Behavior:  Goal: Able to verbalize acceptance of life and situations over which he or she has no control  Description: Able to verbalize acceptance of life and situations over which he or she has no control  Outcome: Ongoing     Problem: Altered Mood, Depressive Behavior:  Goal: Able to verbalize and/or display a decrease in depressive symptoms  Description: Able to verbalize and/or display a decrease in depressive symptoms  11/3/2021 2229 by Yanick Guadarrama RN  Outcome: Ongoing     Problem: Altered Mood, Depressive Behavior:  Goal: Ability to disclose and discuss suicidal ideas will improve  Description: Ability to disclose and discuss suicidal ideas will improve  11/3/2021 2229 by Yanick Guadarrama RN  Outcome: Ongoing     Problem: Altered Mood, Depressive Behavior:  Goal: Able to verbalize support systems  Description: Able to verbalize support systems  Outcome: Ongoing     Problem: Altered Mood, Depressive Behavior:  Goal: Absence of self-harm  Description: Absence of self-harm  Outcome: Ongoing

## 2021-11-04 NOTE — GROUP NOTE
Group Therapy Note    Date: 11/4/2021    Group Start Time: 1130  Group End Time: 1200  Group Topic: Cognitive Skills    [facility]    Thankful MARA Khan LSW        Group Therapy Note    Attendees: 17    Patient's Goal:  Pt will learn about trauma -  risk factors, symptoms and treatments. Notes:  Pt was an active participant in group therapy. Status After Intervention:  Unchanged    Participation Level: Active Listener and Interactive    Participation Quality: Appropriate and Attentive      Speech:  normal      Thought Process/Content: Logical      Affective Functioning: Congruent      Mood: anxious      Level of consciousness:  Alert and Oriented x4      Response to Learning: Able to verbalize current knowledge/experience and Able to verbalize/acknowledge new learning      Endings: None Reported    Modes of Intervention: Education, Support, Socialization, Exploration, Clarifying and Problem-solving      Discipline Responsible: /Counselor      Signature:   MARA Carmona LSW

## 2021-11-04 NOTE — PROGRESS NOTES
Pt attended afternoon meet/greet and recreation activity of games/movie. Updated on evening staff and changes. Pt was 1 out of 9 in attendance.

## 2021-11-05 PROCEDURE — 6370000000 HC RX 637 (ALT 250 FOR IP): Performed by: NURSE PRACTITIONER

## 2021-11-05 PROCEDURE — 99232 SBSQ HOSP IP/OBS MODERATE 35: CPT | Performed by: NURSE PRACTITIONER

## 2021-11-05 PROCEDURE — 1240000000 HC EMOTIONAL WELLNESS R&B

## 2021-11-05 RX ADMIN — OXCARBAZEPINE 300 MG: 300 TABLET, FILM COATED ORAL at 21:39

## 2021-11-05 RX ADMIN — OLANZAPINE 15 MG: 10 TABLET, FILM COATED ORAL at 21:39

## 2021-11-05 RX ADMIN — OXCARBAZEPINE 300 MG: 300 TABLET, FILM COATED ORAL at 09:53

## 2021-11-05 ASSESSMENT — PAIN SCALES - GENERAL
PAINLEVEL_OUTOF10: 0
PAINLEVEL_OUTOF10: 0

## 2021-11-05 NOTE — PLAN OF CARE
Patient denies suicidal ideation, homicidal ideations and AVH. Patient denies anxiety and depression. Patient states he is \"fine. \"  Patient flat, anxious and depressed with blunt responses. Presents calm and cooperative during assessment. Patient is out on the unit and is social with select peers. Medications taken without issue. No complaints or concerns verbalized at this time. No unit problems reported. Will continue to observe and support.     Problem: Altered Mood, Depressive Behavior:  Goal: Able to verbalize acceptance of life and situations over which he or she has no control  Description: Able to verbalize acceptance of life and situations over which he or she has no control  11/4/2021 2219 by Rekha Isaac RN  Outcome: Ongoing     Problem: Altered Mood, Depressive Behavior:  Goal: Able to verbalize and/or display a decrease in depressive symptoms  Description: Able to verbalize and/or display a decrease in depressive symptoms  11/4/2021 2219 by Rekha Isaac RN  Outcome: Ongoing     Problem: Altered Mood, Depressive Behavior:  Goal: Ability to disclose and discuss suicidal ideas will improve  Description: Ability to disclose and discuss suicidal ideas will improve  Outcome: Ongoing     Problem: Altered Mood, Depressive Behavior:  Goal: Able to verbalize support systems  Description: Able to verbalize support systems  Outcome: Ongoing     Problem: Altered Mood, Depressive Behavior:  Goal: Absence of self-harm  Description: Absence of self-harm  Outcome: Ongoing

## 2021-11-05 NOTE — PLAN OF CARE
Problem: Altered Mood, Depressive Behavior:  Goal: Ability to disclose and discuss suicidal ideas will improve  Description: Ability to disclose and discuss suicidal ideas will improve  Outcome: Ongoing  Goal: Able to verbalize support systems  Description: Able to verbalize support systems  Outcome: Ongoing   pt denies si/hi and hallucinations. Pt is isolative to his room most of the day. When patient is out on the unit he keeps to himself. Pt has a flat affect and poor eye contact. Pt takes med's and attends select groups.

## 2021-11-05 NOTE — BH NOTE
[facility] Interdisciplinary Treatment Plan Note     Review Date & Time: 11/05/2021 1016     Patient was in treatment team.    Estimated Length of Stay Update:  5-7 days    Estimated Discharge Date Update: 11/10/2021    EDUCATION:   Learner Progress Toward Treatment Goals: Reviewed group plan and strategies    Method: Small group    Outcome: Needs reinforcement    PATIENT GOALS: go home    PLAN/TREATMENT RECOMMENDATIONS UPDATE: 11/10/2021    GOALS UPDATE:  Time frame for Short-Term Goals: 1-3 days       Johnny Light RN

## 2021-11-05 NOTE — PROGRESS NOTES
BEHAVIORAL HEALTH FOLLOW-UP NOTE     11/5/2021     Patient was seen and examined in person, Chart reviewed   Patient's case discussed with staff/team       Chief Complaint: \"I do not know if I feel better I do not know if I am ready to go home. \"    Interim History: Patient seen during treatment team.  His eye contact is more evasive today he is very flat and blunted. When asked how he is feeling he does not know if he is feeling any better he also does not know if he is ready to go home. He voices multiple stressors involving school and work. He is attending limited groups not socializing with peers very guarded isolative evasive. He appears to be preoccupied makes poor eye contact. Appetite:   [x] Normal/Unchanged  [] Increased  [] Decreased      Sleep:       [x] Normal/Unchanged  [] Fair       [] Poor              Energy:    [x] Normal/Unchanged  [] Increased  [] Decreased        SI [] Present  [x] Absent   HI  []Present  [x] Absent     Aggression:  [] yes  [x] no    Patient is [] able  [] unable to CONTRACT FOR SAFETY     PAST MEDICAL/PSYCHIATRIC HISTORY:   History reviewed. No pertinent past medical history. FAMILY/SOCIAL HISTORY:  History reviewed. No pertinent family history.   Social History     Socioeconomic History    Marital status: Single     Spouse name: Not on file    Number of children: Not on file    Years of education: Not on file    Highest education level: Not on file   Occupational History    Not on file   Tobacco Use    Smoking status: Never Smoker    Smokeless tobacco: Never Used   Vaping Use    Vaping Use: Never used   Substance and Sexual Activity    Alcohol use: Never    Drug use: Never    Sexual activity: Not on file   Other Topics Concern    Not on file   Social History Narrative    Not on file     Social Determinants of Health     Financial Resource Strain:     Difficulty of Paying Living Expenses:    Food Insecurity:     Worried About Running Out of Food in the Last Year:     Ran Out of Food in the Last Year:    Transportation Needs:     Lack of Transportation (Medical):  Lack of Transportation (Non-Medical):    Physical Activity:     Days of Exercise per Week:     Minutes of Exercise per Session:    Stress:     Feeling of Stress :    Social Connections:     Frequency of Communication with Friends and Family:     Frequency of Social Gatherings with Friends and Family:     Attends Sabianist Services:     Active Member of Clubs or Organizations:     Attends Club or Organization Meetings:     Marital Status:    Intimate Partner Violence:     Fear of Current or Ex-Partner:     Emotionally Abused:     Physically Abused:     Sexually Abused:            ROS:  [x] All negative/unchanged except if checked.  Explain positive(checked items) below:  [] Constitutional  [] Eyes  [] Ear/Nose/Mouth/Throat  [] Respiratory  [] CV  [] GI  []   [] Musculoskeletal  [] Skin/Breast  [] Neurological  [] Endocrine  [] Heme/Lymph  [] Allergic/Immunologic    Explanation:     MEDICATIONS:    Current Facility-Administered Medications:     OLANZapine (ZYPREXA) tablet 15 mg, 15 mg, Oral, Nightly, SHIRAZ Ramon CNP, 15 mg at 11/04/21 2013    OXcarbazepine (TRILEPTAL) tablet 300 mg, 300 mg, Oral, BID, SHIRAZ Ramon - CNP, 498 mg at 11/05/21 0953    acetaminophen (TYLENOL) tablet 650 mg, 650 mg, Oral, Q6H PRN, Ifrah Lee MD    magnesium hydroxide (MILK OF MAGNESIA) 400 MG/5ML suspension 30 mL, 30 mL, Oral, Daily PRN, Ifrah Lee MD    aluminum & magnesium hydroxide-simethicone (MAALOX) 200-200-20 MG/5ML suspension 30 mL, 30 mL, Oral, PRN, Ifrah Lee MD    hydrOXYzine (VISTARIL) capsule 50 mg, 50 mg, Oral, TID PRN, Ifrah Lee MD    haloperidol (HALDOL) tablet 5 mg, 5 mg, Oral, Q6H PRN **OR** haloperidol lactate (HALDOL) injection 5 mg, 5 mg, IntraMUSCular, Q6H PRN, Ifrah Lee MD    traZODone (DESYREL) tablet 50 mg, 50 mg, Oral, Priyank Olvera MD      Examination:  BP (!) 92/50   Pulse 67   Temp 97.4 °F (36.3 °C) (Oral)   Resp 16   SpO2 97%   Gait - steady  Medication side effects(SE): none reported    Mental Status Examination:    Level of consciousness:  within normal limits   Appearance:  poor grooming and poor hygiene  Behavior/Motor:  psychomotor retardation  Attitude toward examiner:  hostile, guarded and withdrawn  Speech:  Whispered and poverty of speech   Mood: Irritable  Affect:  flat  Thought processes: incoherent  Thought content: paranoid  Cognition:  oriented to person, place, and time   Concentration poor  Insight poor   Judgement poor     ASSESSMENT:   Patient symptoms are:  [] Well controlled  [] Improving  [] Worsening  [x] No change      Diagnosis:   Active Problems:    MDD (major depressive disorder), single episode, severe with psychotic features ([facility] [address]  Resolved Problems:    * No resolved hospital problems. *      LABS:    No results for input(s): WBC, HGB, PLT in the last 72 hours. No results for input(s): NA, K, CL, CO2, BUN, CREATININE, GLUCOSE in the last 72 hours. No results for input(s): BILITOT, ALKPHOS, AST, ALT in the last 72 hours. Lab Results   Component Value Date    LABAMPH NOT DETECTED 10/29/2021    BARBSCNU NOT DETECTED 10/29/2021    LABBENZ NOT DETECTED 10/29/2021    LABMETH NOT DETECTED 10/29/2021    OPIATESCREENURINE NOT DETECTED 10/29/2021    PHENCYCLIDINESCREENURINE NOT DETECTED 10/29/2021    ETOH <10 10/29/2021     No results found for: TSH, FREET4  No results found for: LITHIUM  No results found for: VALPROATE, CBMZ        Treatment Plan:  The patient's diagnosis, treatment plan, medication management were formulated after patient was seen directly by the attending physician and myself and all relevant documentation was reviewed.       Risk, benefit, side effects, possible outcomes of the medication and alternatives discussed with the patient and the patient demonstrated understanding. The patient was also educated that the outcome of treatment will depend on the medication compliance as directed by the prescribers along with regular follow-up, compliance with the labs and other work-up, as clinically indicated. Increase Zyprexa to 20 mg nightly for psychotic feature  Continue Trileptal 300 mg twice daily for mood stabilization    Collateral information: Followed by social work  CD evaluation  Encourage patient to attend group and other milieu activities.   Discharge planning discussed with the patient and treatment team.    PSYCHOTHERAPY/COUNSELING:  [x] Therapeutic interview  [x] Supportive  [] CBT   [] Ongoing  [] Other    [x] Patient continues to need, on a daily basis, active treatment furnished directly by or requiring the supervision of inpatient psychiatric personnel      Anticipated Length of stay: 7 to 10 days based on stability      Electronically signed by SHIRAZ Lala CNP on 04/8/4173 at 10:37 AM

## 2021-11-05 NOTE — GROUP NOTE
Group Therapy Note    Date: 11/5/2021    Group Start Time: 1110  Group End Time: 7619  Group Topic: Cognitive Skills    [facility]    Thankful MARA Khan LSW        Group Therapy Note    Attendees: 15    Patient's Goal:  Patient will learn healthy communication styles and be able to apply it in a scenario.       Notes:  Patient was an active participant in group therapy. Status After Intervention:  Unchanged    Participation Level: Active Listener    Participation Quality: Appropriate      Speech:  normal      Thought Process/Content: Logical      Affective Functioning: Congruent      Mood: depressed      Level of consciousness:  Alert, Oriented x4 and Attentive      Response to Learning: Able to verbalize current knowledge/experience      Endings: None Reported    Modes of Intervention: Education, Support, Socialization, Exploration, Clarifying and Problem-solving      Discipline Responsible: /Counselor      Signature:   MARA Caraballo LSW

## 2021-11-05 NOTE — GROUP NOTE
Group Therapy Note    Date: 11/5/2021    Group Start Time: 1000  Group End Time: 1050  Group Topic: Psychoeducation    [facility]    Mecca Anders, CTRS        Group Therapy Note      Number of participants: 15  Type of group: Psychoeducation  Mode of intervention: Education, Support, Socialization, Exploration, Clarifying, Problem-solving, and Activity  Topic: Positive Attitude Ball   Objective: Pt will identify 3 ways to maintain a positive attitude in recovery. Notes:  Pt was interactive during group sharing 3 ways to maintain a positive attitude in recovery. Pt gave support and feedback to others. Enjoyed positive ball activity. Status After Intervention:  Improved    Participation Level:  Active Listener and Interactive    Participation Quality: Appropriate, Attentive, Sharing and Supportive      Speech:  normal      Thought Process/Content: Logical      Affective Functioning: Congruent      Mood: euthymic      Level of consciousness:  Alert, Oriented x4 and Attentive      Response to Learning: Able to verbalize current knowledge/experience, Able to verbalize/acknowledge new learning, Able to retain information, Capable of insight, Able to change behavior and Progressing to goal      Endings: None Reported    Modes of Intervention: Education, Support, Socialization, Exploration, Clarifying, Problem-solving and Activity

## 2021-11-05 NOTE — CARE COORDINATION
Pt plans on returning home where he resides with mom. Pt reported that he is no longer attending [employer]. Therefore, he needs a different mental health provider. SW contacted [facility] to schedule appointment.  Pt appointment is scheduled for November 16 @ 10:00 AM    Abida FERNANDES,[facility], Intern   Electronically signed by Johanna Polanco on 11/5/2021 at 3:43 PM

## 2021-11-06 PROCEDURE — 1240000000 HC EMOTIONAL WELLNESS R&B

## 2021-11-06 PROCEDURE — 6370000000 HC RX 637 (ALT 250 FOR IP): Performed by: NURSE PRACTITIONER

## 2021-11-06 PROCEDURE — 99231 SBSQ HOSP IP/OBS SF/LOW 25: CPT | Performed by: NURSE PRACTITIONER

## 2021-11-06 RX ADMIN — OXCARBAZEPINE 300 MG: 300 TABLET, FILM COATED ORAL at 21:25

## 2021-11-06 RX ADMIN — OLANZAPINE 15 MG: 10 TABLET, FILM COATED ORAL at 21:25

## 2021-11-06 RX ADMIN — OXCARBAZEPINE 300 MG: 300 TABLET, FILM COATED ORAL at 11:42

## 2021-11-06 ASSESSMENT — PAIN SCALES - GENERAL
PAINLEVEL_OUTOF10: 0
PAINLEVEL_OUTOF10: 0

## 2021-11-06 NOTE — PROGRESS NOTES
Patient was out on unit but stays to self. Denies SI,HI or AV hallucinations. Flat,sad, blunt. Medications compliant and attended groups. Will continue to monitor.

## 2021-11-06 NOTE — GROUP NOTE
Group Therapy Note    Date: 11/6/2021    Group Start Time: 7118  Group End Time: 1110  Group Topic: Cognitive Skills    [facility]  [address] [facility], [facility]        Group Therapy Note    Attendees: 10         Patient's Goal:  Pt will be able to identify self-care and new ways to implement self-care to promote overall happiness. Notes:  Pt participated in group and made connections. Pt was able to discuss group topic and some unhealthy self-care habits he needs to work on. Status After Intervention:  Improved    Participation Level:  Active Listener and Interactive    Participation Quality: Appropriate, Attentive and Sharing      Speech:  normal      Thought Process/Content: Logical      Affective Functioning: Flat      Mood: depressed      Level of consciousness:  Alert, Oriented x4 and Attentive      Response to Learning: Able to verbalize current knowledge/experience, Able to verbalize/acknowledge new learning, Able to retain information and Capable of insight      Endings: None Reported    Modes of Intervention: Education, Support, Socialization, Exploration, Clarifying and Problem-solving      Discipline Responsible: /Counselor      Signature:  MARA Tate, [place]

## 2021-11-06 NOTE — PLAN OF CARE
Problem: Altered Mood, Depressive Behavior:  Goal: Able to verbalize and/or display a decrease in depressive symptoms  Description: Able to verbalize and/or display a decrease in depressive symptoms  Outcome: Ongoing  Goal: Ability to disclose and discuss suicidal ideas will improve  Description: Ability to disclose and discuss suicidal ideas will improve  Outcome: Ongoing   pt denies si/hi and hallucinations. Pt has a flat affect with poor eye contact. Pt is isolative to his room most of the day and keeps to self when out on the unit. 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 Pt takes med's and attends groups.

## 2021-11-07 PROCEDURE — 6370000000 HC RX 637 (ALT 250 FOR IP): Performed by: NURSE PRACTITIONER

## 2021-11-07 PROCEDURE — 99231 SBSQ HOSP IP/OBS SF/LOW 25: CPT | Performed by: NURSE PRACTITIONER

## 2021-11-07 PROCEDURE — 1240000000 HC EMOTIONAL WELLNESS R&B

## 2021-11-07 RX ADMIN — OXCARBAZEPINE 300 MG: 300 TABLET, FILM COATED ORAL at 09:31

## 2021-11-07 RX ADMIN — OXCARBAZEPINE 300 MG: 300 TABLET, FILM COATED ORAL at 20:10

## 2021-11-07 RX ADMIN — OLANZAPINE 15 MG: 10 TABLET, FILM COATED ORAL at 20:10

## 2021-11-07 ASSESSMENT — PAIN SCALES - GENERAL
PAINLEVEL_OUTOF10: 0

## 2021-11-07 NOTE — PLAN OF CARE
Problem: Altered Mood, Depressive Behavior:  Goal: Able to verbalize acceptance of life and situations over which he or she has no control  Description: Able to verbalize acceptance of life and situations over which he or she has no control  Outcome: Ongoing     Problem: Altered Mood, Depressive Behavior:  Goal: Able to verbalize and/or display a decrease in depressive symptoms  Description: Able to verbalize and/or display a decrease in depressive symptoms  Outcome: Ongoing     Problem: Altered Mood, Depressive Behavior:  Goal: Ability to disclose and discuss suicidal ideas will improve  Description: Ability to disclose and discuss suicidal ideas will improve  11/7/2021 1636 by Jed Polk RN  Outcome: Met This Shift  11/7/2021 0915 by Darwin Ortiz RN  Outcome: Ongoing     Pt denies suicidal ideations, homicidal ideations and hallucinations. Pt out on the unit watching television and coloring pictures. Pt has poor concentration. Avoids gaze. Anxious and suspicious. Evasive. Blunted answers. Will continue to monitor.

## 2021-11-07 NOTE — PLAN OF CARE
Problem: Altered Mood, Depressive Behavior:  Goal: Ability to disclose and discuss suicidal ideas will improve  Description: Ability to disclose and discuss suicidal ideas will improve  Outcome: Ongoing  Goal: Able to verbalize support systems  Description: Able to verbalize support systems  Outcome: Ongoing   pt denies si/hi and hallucinations. Pt has a flat affect with poor eye contact. Pt is guarded and evasive with questioning. Pt out on the unit but keeps to self. Pt takes med's and attends groups.

## 2021-11-07 NOTE — PROGRESS NOTES
BEHAVIORAL HEALTH FOLLOW-UP NOTE     11/7/2021     Patient was seen and examined in person, Chart reviewed   Patient's case discussed with staff/team       Chief Complaint: \"I do not know. \"    Interim History: Patient seen in his room this morning, he is very irritable he makes minimal eye contact. He offers very short blunt answers. He is very flat and blunted. When asked how he is feeling he does not know if he is feeling any better he also does not know if he is ready to go home. He voices multiple stressors involving school and work. He is attending limited groups not socializing with peers very guarded isolative evasive. He appears to be preoccupied makes poor eye contact, this possibly thought blocking. Appetite:   [x] Normal/Unchanged  [] Increased  [] Decreased      Sleep:       [x] Normal/Unchanged  [] Fair       [] Poor              Energy:    [x] Normal/Unchanged  [] Increased  [] Decreased        SI [] Present  [x] Absent   HI  []Present  [x] Absent     Aggression:  [] yes  [x] no    Patient is [] able  [] unable to CONTRACT FOR SAFETY     PAST MEDICAL/PSYCHIATRIC HISTORY:   History reviewed. No pertinent past medical history. FAMILY/SOCIAL HISTORY:  History reviewed. No pertinent family history.   Social History     Socioeconomic History    Marital status: Single     Spouse name: Not on file    Number of children: Not on file    Years of education: Not on file    Highest education level: Not on file   Occupational History    Not on file   Tobacco Use    Smoking status: Never Smoker    Smokeless tobacco: Never Used   Vaping Use    Vaping Use: Never used   Substance and Sexual Activity    Alcohol use: Never    Drug use: Never    Sexual activity: Not on file   Other Topics Concern    Not on file   Social History Narrative    Not on file     Social Determinants of Health     Financial Resource Strain:     Difficulty of Paying Living Expenses: Not on file   Food Insecurity:     Worried About [address] in the Last Year: Not on file    Alfonzo of Food in the Last Year: Not on file   Transportation Needs:     Lack of Transportation (Medical): Not on file    Lack of Transportation (Non-Medical): Not on file   Physical Activity:     Days of Exercise per Week: Not on file    Minutes of Exercise per Session: Not on file   Stress:     Feeling of Stress : Not on file   Social Connections:     Frequency of Communication with Friends and Family: Not on file    Frequency of Social Gatherings with Friends and Family: Not on file    Attends Buddhism Services: Not on file    Active Member of [address] [place] [address] or Organizations: Not on file    Attends Club or Organization Meetings: Not on file    Marital Status: Not on file   Intimate Partner Violence:     Fear of Current or Ex-Partner: Not on file    Emotionally Abused: Not on file    Physically Abused: Not on file    Sexually Abused: Not on file   Housing Stability:     Unable to Pay for Housing in the Last Year: Not on file    Number of [place] in the Last Year: Not on file    Unstable Housing in the Last Year: Not on file           ROS:  [x] All negative/unchanged except if checked.  Explain positive(checked items) below:  [] Constitutional  [] Eyes  [] Ear/Nose/Mouth/Throat  [] Respiratory  [] CV  [] GI  []   [] Musculoskeletal  [] Skin/Breast  [] Neurological  [] Endocrine  [] Heme/Lymph  [] Allergic/Immunologic    Explanation:     MEDICATIONS:    Current Facility-Administered Medications:     OLANZapine (ZYPREXA) tablet 15 mg, 15 mg, Oral, Nightly, SHIRAZ Kiser - CNP, 15 mg at 11/06/21 2125    OXcarbazepine (TRILEPTAL) tablet 300 mg, 300 mg, Oral, BID, SHIRAZ Little - CNP, 369 mg at 11/07/21 0931    acetaminophen (TYLENOL) tablet 650 mg, 650 mg, Oral, Q6H PRN, Dino Pandya MD    magnesium hydroxide (MILK OF MAGNESIA) 400 MG/5ML suspension 30 mL, 30 mL, Oral, Daily PRN, Dino Pandya MD    aluminum & magnesium hydroxide-simethicone (MAALOX) 200-200-20 MG/5ML suspension 30 mL, 30 mL, Oral, PRN, Ren Ash MD    hydrOXYzine (VISTARIL) capsule 50 mg, 50 mg, Oral, TID PRN, Ren Ash MD    haloperidol (HALDOL) tablet 5 mg, 5 mg, Oral, Q6H PRN **OR** haloperidol lactate (HALDOL) injection 5 mg, 5 mg, IntraMUSCular, Q6H PRN, Ren Ash MD    traZODone (DESYREL) tablet 50 mg, 50 mg, Oral, Nightly PRN, Ren Ash MD      Examination:  BP (!) 146/66   Pulse 94   Temp 98.4 °F (36.9 °C)   Resp 15   Ht 5' 7\" (1.702 m)   Wt 150 lb (68 kg)   SpO2 97%   BMI 23.49 kg/m²   Gait - steady  Medication side effects(SE): none reported    Mental Status Examination:    Level of consciousness:  within normal limits   Appearance:  poor grooming and poor hygiene  Behavior/Motor:  psychomotor retardation  Attitude toward examiner:  hostile, guarded and withdrawn  Speech:  Whispered and poverty of speech   Mood: Irritable  Affect:  flat  Thought processes: incoherent  Thought content: paranoid  Cognition:  oriented to person, place, and time   Concentration poor  Insight poor   Judgement poor     ASSESSMENT:   Patient symptoms are:  [] Well controlled  [] Improving  [] Worsening  [x] No change      Diagnosis:   Principal Problem:    MDD (major depressive disorder), single episode, severe with psychotic features ([facility] [address]  Resolved Problems:    * No resolved hospital problems. *      LABS:    No results for input(s): WBC, HGB, PLT in the last 72 hours. No results for input(s): NA, K, CL, CO2, BUN, CREATININE, GLUCOSE in the last 72 hours. No results for input(s): BILITOT, ALKPHOS, AST, ALT in the last 72 hours.   Lab Results   Component Value Date    LABAMPH NOT DETECTED 10/29/2021    BARBSCNU NOT DETECTED 10/29/2021    LABBENZ NOT DETECTED 10/29/2021    LABMETH NOT DETECTED 10/29/2021    OPIATESCREENURINE NOT DETECTED 10/29/2021    PHENCYCLIDINESCREENURINE NOT DETECTED 10/29/2021    ETOH <10 10/29/2021 No results found for: TSH, FREET4  No results found for: LITHIUM  No results found for: VALPROATE, CBMZ        Treatment Plan:  The patient's diagnosis, treatment plan, medication management were formulated after patient was seen directly by the attending physician and myself and all relevant documentation was reviewed. Risk, benefit, side effects, possible outcomes of the medication and alternatives discussed with the patient and the patient demonstrated understanding. The patient was also educated that the outcome of treatment will depend on the medication compliance as directed by the prescribers along with regular follow-up, compliance with the labs and other work-up, as clinically indicated. Increase Zyprexa to 20 mg nightly for psychotic feature  Continue Trileptal 300 mg twice daily for mood stabilization    Collateral information: Followed by social work  CD evaluation  Encourage patient to attend group and other milieu activities.   Discharge planning discussed with the patient and treatment team.    PSYCHOTHERAPY/COUNSELING:  [x] Therapeutic interview  [x] Supportive  [] CBT   [] Ongoing  [] Other    [x] Patient continues to need, on a daily basis, active treatment furnished directly by or requiring the supervision of inpatient psychiatric personnel      Anticipated Length of stay: 7 to 10 days based on stability      Electronically signed by SHIRAZ Sandhu CNP on 11/7/2021 at 1:21 PM

## 2021-11-07 NOTE — PROGRESS NOTES
BEHAVIORAL HEALTH FOLLOW-UP NOTE     11/7/2021     Patient was seen and examined in person, Chart reviewed   Patient's case discussed with staff/team       Chief Complaint: \"I do not know if I feel better I do not know if I am ready to go home. \"    Interim History: Patient seen in his room this morning, he is very irritable he makes minimal eye contact. He offers very short blunt answers. He is very flat and blunted. When asked how he is feeling he does not know if he is feeling any better he also does not know if he is ready to go home. He voices multiple stressors involving school and work. He is attending limited groups not socializing with peers very guarded isolative evasive. He appears to be preoccupied makes poor eye contact, this possibly thought blocking. Appetite:   [x] Normal/Unchanged  [] Increased  [] Decreased      Sleep:       [x] Normal/Unchanged  [] Fair       [] Poor              Energy:    [x] Normal/Unchanged  [] Increased  [] Decreased        SI [] Present  [x] Absent   HI  []Present  [x] Absent     Aggression:  [] yes  [x] no    Patient is [] able  [] unable to CONTRACT FOR SAFETY     PAST MEDICAL/PSYCHIATRIC HISTORY:   History reviewed. No pertinent past medical history. FAMILY/SOCIAL HISTORY:  History reviewed. No pertinent family history.   Social History     Socioeconomic History    Marital status: Single     Spouse name: Not on file    Number of children: Not on file    Years of education: Not on file    Highest education level: Not on file   Occupational History    Not on file   Tobacco Use    Smoking status: Never Smoker    Smokeless tobacco: Never Used   Vaping Use    Vaping Use: Never used   Substance and Sexual Activity    Alcohol use: Never    Drug use: Never    Sexual activity: Not on file   Other Topics Concern    Not on file   Social History Narrative    Not on file     Social Determinants of Health     Financial Resource Strain:     Difficulty of Paying Living Expenses: Not on file   Food Insecurity:     Worried About Running Out of Food in the Last Year: Not on file    Ran Out of Food in the Last Year: Not on file   Transportation Needs:     Lack of Transportation (Medical): Not on file    Lack of Transportation (Non-Medical): Not on file   Physical Activity:     Days of Exercise per Week: Not on file    Minutes of Exercise per Session: Not on file   Stress:     Feeling of Stress : Not on file   Social Connections:     Frequency of Communication with Friends and Family: Not on file    Frequency of Social Gatherings with Friends and Family: Not on file    Attends Buddhism Services: Not on file    Active Member of [address] [place] [address] [employer] or Organizations: Not on file    Attends Club or Organization Meetings: Not on file    Marital Status: Not on file   Intimate Partner Violence:     Fear of Current or Ex-Partner: Not on file    Emotionally Abused: Not on file    Physically Abused: Not on file    Sexually Abused: Not on file   Housing Stability:     Unable to Pay for Housing in the Last Year: Not on file    Number of [place] in the Last Year: Not on file    Unstable Housing in the Last Year: Not on file           ROS:  [x] All negative/unchanged except if checked.  Explain positive(checked items) below:  [] Constitutional  [] Eyes  [] Ear/Nose/Mouth/Throat  [] Respiratory  [] CV  [] GI  []   [] Musculoskeletal  [] Skin/Breast  [] Neurological  [] Endocrine  [] Heme/Lymph  [] Allergic/Immunologic    Explanation:     MEDICATIONS:    Current Facility-Administered Medications:     OLANZapine (ZYPREXA) tablet 15 mg, 15 mg, Oral, Nightly, SHIRAZ Kiser - CNP, 15 mg at 11/06/21 2125    OXcarbazepine (TRILEPTAL) tablet 300 mg, 300 mg, Oral, BID, Maciej Casper APRN - CNP, 250 mg at 11/07/21 0931    acetaminophen (TYLENOL) tablet 650 mg, 650 mg, Oral, Q6H PRN, Zaria Hurley MD    magnesium hydroxide (MILK OF MAGNESIA) 400 MG/5ML suspension 30 mL, 30 mL, Oral, Daily PRN, Priyank Rebolledo MD    aluminum & magnesium hydroxide-simethicone (MAALOX) 200-200-20 MG/5ML suspension 30 mL, 30 mL, Oral, PRN, Priyank Rebolledo MD    hydrOXYzine (VISTARIL) capsule 50 mg, 50 mg, Oral, TID PRN, Priyank Rebolledo MD    haloperidol (HALDOL) tablet 5 mg, 5 mg, Oral, Q6H PRN **OR** haloperidol lactate (HALDOL) injection 5 mg, 5 mg, IntraMUSCular, Q6H PRN, Priyank Rebolledo MD    traZODone (DESYREL) tablet 50 mg, 50 mg, Oral, Nightly PRN, Priyank Rebolledo MD      Examination:  BP (!) 146/66   Pulse 94   Temp 98.4 °F (36.9 °C)   Resp 15   Ht 5' 7\" (1.702 m)   Wt 150 lb (68 kg)   SpO2 97%   BMI 23.49 kg/m²   Gait - steady  Medication side effects(SE): none reported    Mental Status Examination:    Level of consciousness:  within normal limits   Appearance:  poor grooming and poor hygiene  Behavior/Motor:  psychomotor retardation  Attitude toward examiner:  hostile, guarded and withdrawn  Speech:  Whispered and poverty of speech   Mood: Irritable  Affect:  flat  Thought processes: incoherent  Thought content: paranoid  Cognition:  oriented to person, place, and time   Concentration poor  Insight poor   Judgement poor     ASSESSMENT:   Patient symptoms are:  [] Well controlled  [] Improving  [] Worsening  [x] No change      Diagnosis:   Active Problems:    MDD (major depressive disorder), single episode, [facility]  Resolved Problems:    * No resolved hospital problems. *      LABS:    No results for input(s): WBC, HGB, PLT in the last 72 hours. No results for input(s): NA, K, CL, CO2, BUN, CREATININE, GLUCOSE in the last 72 hours. No results for input(s): BILITOT, ALKPHOS, AST, ALT in the last 72 hours.   Lab Results   Component Value Date    LABAMPH NOT DETECTED 10/29/2021    BARBSCNU NOT DETECTED 10/29/2021    LABBENZ NOT DETECTED 10/29/2021    LABMETH NOT DETECTED 10/29/2021    OPIATESCREENURINE NOT DETECTED 10/29/2021

## 2021-11-08 VITALS
HEART RATE: 65 BPM | HEIGHT: 67 IN | TEMPERATURE: 98.5 F | WEIGHT: 150 LBS | BODY MASS INDEX: 23.54 KG/M2 | SYSTOLIC BLOOD PRESSURE: 96 MMHG | RESPIRATION RATE: 14 BRPM | DIASTOLIC BLOOD PRESSURE: 47 MMHG | OXYGEN SATURATION: 97 %

## 2021-11-08 PROCEDURE — 99239 HOSP IP/OBS DSCHRG MGMT >30: CPT | Performed by: NURSE PRACTITIONER

## 2021-11-08 PROCEDURE — 6370000000 HC RX 637 (ALT 250 FOR IP): Performed by: NURSE PRACTITIONER

## 2021-11-08 RX ORDER — OXCARBAZEPINE 300 MG/1
300 TABLET, FILM COATED ORAL 2 TIMES DAILY
Qty: 60 TABLET | Refills: 0 | Status: SHIPPED | OUTPATIENT
Start: 2021-11-08 | End: 2021-12-08

## 2021-11-08 RX ORDER — OLANZAPINE 15 MG/1
15 TABLET ORAL NIGHTLY
Qty: 30 TABLET | Refills: 0 | Status: SHIPPED | OUTPATIENT
Start: 2021-11-08 | End: 2021-12-08

## 2021-11-08 RX ADMIN — OXCARBAZEPINE 300 MG: 300 TABLET, FILM COATED ORAL at 09:42

## 2021-11-08 ASSESSMENT — PAIN SCALES - GENERAL: PAINLEVEL_OUTOF10: 0

## 2021-11-08 NOTE — GROUP NOTE
Group Therapy Note    Date: 11/8/2021    Group Start Time: 1100  Group End Time: 1130  Group Topic: Psychoeducation    [facility]    MARA Ricketts LSW        Group Therapy Note    Attendees: 12         Patient's Goal:  To verbalize and understand grounding techniques    Notes:  Patient attended and made connections    Status After Intervention:  Improved    Participation Level:  Active Listener    Participation Quality: Appropriate      Speech:  normal      Thought Process/Content: Logical      Affective Functioning: Flat      Mood: depressed      Level of consciousness:  Alert, Oriented x4, Preoccupied and Inattentive      Response to Learning: Able to verbalize current knowledge/experience      Endings: None Reported    Modes of Intervention: Education, Support, Socialization, Exploration, Clarifying and Problem-solving      Discipline Responsible: /Counselor      Signature:  MARA Ricketts LSW

## 2021-11-08 NOTE — BH NOTE
Pt discharged with followings belongings:   Dentures: None  Vision - Corrective Lenses: None  Hearing Aid: None  Jewelry: None  Body Piercings Removed: N/A  Clothing: Footwear, Pants, Shirt, Socks, Undergarments (Comment)  Were All Patient Medications Collected?: Not Applicable  Other Valuables: Other (Comment)   Valuables sent home with patient on discharge. Valuables retrieved from safe, Security envelope number:  none and returned to patient. Patient left department with Departure Mode: With parents via Mobility at Departure: Ambulatory, discharged to Discharged to: Private Residence. Patient education on aftercare instructions:  yes  Patient verbalize understanding of AVS:  yes. Given suicide prevention handout . Discharged in stable condition.   Status EXAM upon discharge:  Status and Exam  Normal: Yes  Facial Expression: Avoids Gaze, Flat  Affect: Appropriate, Congruent  Level of Consciousness: Alert  Mood:Normal: No  Mood: Anxious  Motor Activity:Normal: Yes  Motor Activity: Decreased  Interview Behavior: Cooperative  Preception: [place] to Person, Sherleen Mew to Time, [place] to Place, [place] to Situation  Attention:Normal: No  Attention: Distractible  Thought Processes: Circumstantial  Thought Content:Normal: Yes  Thought Content: Poverty of Content  Hallucinations: None  Delusions: No  Delusions: Obsessions  Memory:Normal: Yes  Memory: Poor Recent  Insight and Judgment: No  Insight and Judgment: Other(See comment) (improved)  Present Suicidal Ideation: No  Present Homicidal Ideation: No    Isabel Munoz RN

## 2021-11-08 NOTE — CARE COORDINATION
In order to ensure appropriate transition and discharge planning is in place, the following documents have been transmitted to [facility], as the new outpatient provider:     The d/c diagnosis was transmitted to the next care provider   The reason for hospitalization was transmitted to the next care provider   The d/c medications (dosage and indication) were transmitted to the next care provider    The continuing care plan was transmitted to the next care provider

## 2021-11-08 NOTE — PROGRESS NOTES
CLINICAL PHARMACY NOTE: MEDS TO BEDS    Total # of Prescriptions Filled: 2   The following medications were delivered to the patient:  · Oxcarbazepine 300 mg  · Olanzapine 15 mg    Additional Documentation:  Delivered to Sandee REYES) @ 11:30 am

## 2021-11-08 NOTE — DISCHARGE SUMMARY
DISCHARGE SUMMARY      Patient ID:  Anastasia Perry  [id number]  22 y.o.  2000    Admit date: 10/29/2021    Discharge date and time: 11/8/2021    Admitting Physician: Emory Romero MD     Discharge Physician: Dr Wild Bean MD    Discharge Diagnoses:   Patient Active Problem List   Diagnosis    MDD (major depressive disorder), single episode, severe with psychotic features [facility])       Admission Condition: poor    Discharged Condition: stable    Admission Circumstance: Presented to the ED for concern of mental status. Notes I do    PAST MEDICAL/PSYCHIATRIC HISTORY:   History reviewed. No pertinent past medical history. FAMILY/SOCIAL HISTORY:  History reviewed. No pertinent family history. Social History     Socioeconomic History    Marital status: Single     Spouse name: Not on file    Number of children: Not on file    Years of education: Not on file    Highest education level: Not on file   Occupational History    Not on file   Tobacco Use    Smoking status: Never Smoker    Smokeless tobacco: Never Used   Vaping Use    Vaping Use: Never used   Substance and Sexual Activity    Alcohol use: Never    Drug use: Never    Sexual activity: Not on file   Other Topics Concern    Not on file   Social History Narrative    Not on file     Social Determinants of Health     Financial Resource Strain:     Difficulty of Paying Living Expenses: Not on file   Food Insecurity:     Worried About [address] in the Last Year: Not on file    [place] of Food in the Last Year: Not on file   Transportation Needs:     Lack of Transportation (Medical): Not on file    Lack of Transportation (Non-Medical):  Not on file   Physical Activity:     Days of Exercise per Week: Not on file    Minutes of Exercise per Session: Not on file   Stress:     Feeling of Stress : Not on file   Social Connections:     Frequency of Communication with Friends and Family: Not on file    Frequency of Social Gatherings with Friends and Family: Not on file    Attends Evangelical Services: Not on file    Active Member of Clubs or Organizations: Not on file    Attends Club or Organization Meetings: Not on file    Marital Status: Not on file   Intimate Partner Violence:     Fear of Current or Ex-Partner: Not on file    Emotionally Abused: Not on file    Physically Abused: Not on file    Sexually Abused: Not on file   Housing Stability:     Unable to Pay for Housing in the Last Year: Not on file    Number of [place] in the Last Year: Not on file    Unstable Housing in the Last Year: Not on file       MEDICATIONS:    Current Facility-Administered Medications:     OLANZapine (ZYPREXA) tablet 15 mg, 15 mg, Oral, Nightly, Maciej Casper APRN - CNP, 15 mg at 11/07/21 2010    OXcarbazepine (TRILEPTAL) tablet 300 mg, 300 mg, Oral, BID, SHIRAZ Camara - CNP, 194 mg at 11/08/21 8641    acetaminophen (TYLENOL) tablet 650 mg, 650 mg, Oral, Q6H PRN, Zaria Hurley MD    magnesium hydroxide (MILK OF MAGNESIA) 400 MG/5ML suspension 30 mL, 30 mL, Oral, Daily PRN, Zaria Hurley MD    aluminum & magnesium hydroxide-simethicone (MAALOX) 200-200-20 MG/5ML suspension 30 mL, 30 mL, Oral, PRN, Zaria Hurley MD    hydrOXYzine (VISTARIL) capsule 50 mg, 50 mg, Oral, TID PRN, Zaria Hurley MD    haloperidol (HALDOL) tablet 5 mg, 5 mg, Oral, Q6H PRN **OR** haloperidol lactate (HALDOL) injection 5 mg, 5 mg, IntraMUSCular, Q6H PRN, Zaria Hurley MD    traZODone (DESYREL) tablet 50 mg, 50 mg, Oral, Nightly PRN, Zaria Hurley MD    Examination:  BP (!) 96/47   Pulse 65   Temp 98.5 °F (36.9 °C)   Resp 14   Ht 5' 7\" (1.702 m)   Wt 150 lb (68 kg)   SpO2 97%   BMI 23.49 kg/m²   Gait - steady    HOSPITAL COURSE[de-identified]   Patient was admitted to the unit on 10/29/2021 was closely monitored for psychosis and depression he was evaluated was originally treated with Lexapro and Zyprexa however patient became very angry and agitated with the Lexapro he was therefore taken off Lexapro and put on a mood stabilizer of Trileptal 300 mg twice daily and Zyprexa was optimized up to 50 mg at bedtime. .  Medical events were insignificant and patient continued to improve on the floor. He start coming out of his room he is attending groups to socializing with peers. He was seen out in the unit playing cards with peers and talk to his mother. He never made any suicidal statements or any suicidal gestures while in the unit. Social workers obtain collateral information from patient's mother who was able to voicing concerns that she had. She reported no safety concerns no access to any guns. She stated she had talked to patient recently and that he sounded much better he was more open with her and talking more to her. He was seen in treatment team prior to discharge and treatment team felt the patient obtain the maximum benefit from his hospitalization he was set up with an outpatient mental health agency for outpatient follow-up services. At the time of discharge patient did not show any impulsive behavior. He was up on the unit he was attending groups and socializing with peers. He vehemently denied any suicidal homicidal ideations intent or plan. He was eating well and sleeping well there are no neurovegetative signs or symptoms of depression he denied any auditory or visual hallucinations. There are no overt or covert signs of psychosis. He was appreciative of the help that he received here. This patient no longer meets criteria for inpatient hospitalization.           No AVH or paranoid thoughts  No hopeless or worthless feeling  No active SI/HI  Appetite:  [x] Normal  [] Increased  [] Decreased    Sleep:       [x] Normal  [] Fair       [] Poor            Energy:    [x] Normal  [] Increased  [] Decreased     SI [] Present  [x] Absent  HI  []Present  [x] Absent   Aggression:  [] yes  [x] no  Patient is [x] able  [] unable to CONTRACT FOR SAFETY   Medication side effects(SE):  [x] None(Psych. Meds.) [] Other      Mental Status Examination on discharge:    Level of consciousness:  within normal limits   Appearance:  well-appearing  Behavior/Motor:  no abnormalities noted  Attitude toward examiner:  attentive and good eye contact  Speech:  spontaneous, normal rate and normal volume   Mood: \" My mood is good. \"  Affect: Appropriate and pleasant  Thought processes: Linear without flight of ideas loose associations  Thought content: Devoid of any auditory visual hallucinations delusions or other perceptual denies. Denies SI/HI intent or plan  Cognition:  oriented to person, place, and time   Concentration intact  Memory intact  Insight good   Judgement fair   Fund of Knowledge adequate      ASSESSMENT:  Patient symptoms are:  [x] Well controlled  [x] Improving  [] Worsening  [] No change    Reason for more than one antipsychotic:  [x] N/A  [] 3 Failed Monotherapy attempts (Drugs tried:)  [] Crossover to a new antipsychotic  [] Taper to Monotherapy from Polypharmacy  [] Augmentation of clozapine therapy due to treatment resistance to single therapy    Diagnosis:  Principal Problem:    MDD (major depressive disorder), single episode, severe with psychotic features ([facility]  Resolved Problems:    * No resolved hospital problems. *      LABS:    No results for input(s): WBC, HGB, PLT in the last 72 hours. No results for input(s): NA, K, CL, CO2, BUN, CREATININE, GLUCOSE in the last 72 hours. No results for input(s): BILITOT, ALKPHOS, AST, ALT in the last 72 hours.   Lab Results   Component Value Date    LABAMPH NOT DETECTED 10/29/2021    BARBSCNU NOT DETECTED 10/29/2021    LABBENZ NOT DETECTED 10/29/2021    LABMETH NOT DETECTED 10/29/2021    OPIATESCREENURINE NOT DETECTED 10/29/2021    PHENCYCLIDINESCREENURINE NOT DETECTED 10/29/2021    ETOH <10 10/29/2021     No results found for: TSH, FREET4  No results found for: LITHIUM  No results found for: VALPROATE, CBMZ    RISK ASSESSMENT AT DISCHARGE: Low risk for suicide and homicide. Treatment Plan:  Reviewed current Medications with the patient. Education provided on the complaince with treatment. Risks, benefits, side effects, drug-to-drug interactions and alternatives to treatment were discussed. Encourage patient to attend outpatient follow up appointment and therapy. Patient was advised to call the outpatient provider, visit the nearest ED or call 911 if symptoms are not manageable. Patient's family member was contacted prior to the discharge.          Medication List      START taking these medications    OLANZapine 15 MG tablet  Commonly known as: ZYPREXA  Take 1 tablet by mouth nightly     OXcarbazepine 300 MG tablet  Commonly known as: TRILEPTAL  Take 1 tablet by mouth 2 times daily           Where to Get Your Medications      These medications were sent to [facility] [address] [address]    Phone: [phone]   · OLANZapine 15 MG tablet  · OXcarbazepine 300 MG tablet       Patient is counseling has been compliant with all medications outpatient follow appointments    Patient is discharged home in stable condition    TIME SPEND - [facility] [address], DISCHARGE SUMMARY, MEDICATION RECONCILIATION AND FOLLOW UP CARE     Signed:  SHIRAZ Gastelum - CNP  27/2/4717  1:50 PM

## 2021-11-08 NOTE — CARE COORDINATION
Tanya contacted pt mom Mary [facility] [phone] to discuss pt discharge. No answer, voicemail left.

## 2021-11-08 NOTE — GROUP NOTE
Group Therapy Note    Date: 11/8/2021    Group Start Time: 1000  Group End Time: 7253  Group Topic: Psychoeducation    [facility]    Mecca Anders, CTRS        Group Therapy Note      Number of participants: 10  Type of group: Psychoeducation  Mode of intervention: Education, Support, Socialization, Exploration, Clarifying, and Problem-solving  Topic: Time Management Skills  Objective: Pt will identify 1 way to improve time management skills in recovery. Patient's Goal:  Pt reports \"I don't know\"     Notes:  Pt was interactive during group sharing 1 way to improve time management skills in recovery. Pt gave support and feedback to others. Status After Intervention:  Improved    Participation Level:  Active Listener and Interactive    Participation Quality: Appropriate, Attentive, Sharing and Supportive      Speech:  normal      Thought Process/Content: Logical      Affective Functioning: Congruent      Mood: euthymic      Level of consciousness:  Alert, Oriented x4 and Attentive      Response to Learning: Able to verbalize current knowledge/experience, Able to verbalize/acknowledge new learning, Able to retain information, Capable of insight, Able to change behavior and Progressing to goal      Endings: None Reported    Modes of Intervention: Education, Support, Socialization, Exploration, Clarifying and Problem-solving

## 2021-11-08 NOTE — PROGRESS NOTES
Pt attended leisure group of [facility]. Updated on evening staff and changes. Pt was 1 out of 8 in attendance.

## 2021-11-08 NOTE — CARE COORDINATION
Sw spoke with pt mom Gabby Dinero. Gabby Dinero reports she has talked with the pt and he sounds a lot better, he is talking to her more and opening up more. She expressed no concerns for pt discharging today, reports pt brother Tisha Benedict  will be the one to pick him up. No other questions or concerns, offered to assist as needed.

## 2021-11-08 NOTE — PLAN OF CARE
Problem: Altered Mood, Depressive Behavior:  Goal: Able to verbalize and/or display a decrease in depressive symptoms  Description: Able to verbalize and/or display a decrease in depressive symptoms  Outcome: Ongoing  Goal: Ability to disclose and discuss suicidal ideas will improve  Description: Ability to disclose and discuss suicidal ideas will improve  Outcome: Ongoing           Patient quiet. Good eye contact. Preparing for discharge around 4 pm for . Denies suicidal and homicidal thought. Denies hallucinations.